# Patient Record
Sex: MALE | Race: WHITE | NOT HISPANIC OR LATINO | URBAN - METROPOLITAN AREA
[De-identification: names, ages, dates, MRNs, and addresses within clinical notes are randomized per-mention and may not be internally consistent; named-entity substitution may affect disease eponyms.]

---

## 2020-03-02 ENCOUNTER — OFFICE VISIT - HEALTHEAST (OUTPATIENT)
Dept: FAMILY MEDICINE | Facility: CLINIC | Age: 30
End: 2020-03-02

## 2020-03-02 ENCOUNTER — RECORDS - HEALTHEAST (OUTPATIENT)
Dept: ADMINISTRATIVE | Facility: OTHER | Age: 30
End: 2020-03-02

## 2020-03-02 DIAGNOSIS — S39.012A LOW BACK STRAIN, INITIAL ENCOUNTER: ICD-10-CM

## 2020-03-02 RX ORDER — CYCLOBENZAPRINE HCL 10 MG
10 TABLET ORAL EVERY 8 HOURS PRN
Qty: 30 TABLET | Refills: 0 | Status: SHIPPED | OUTPATIENT
Start: 2020-03-02

## 2020-03-02 ASSESSMENT — MIFFLIN-ST. JEOR: SCORE: 2266.25

## 2020-03-04 ENCOUNTER — OFFICE VISIT - HEALTHEAST (OUTPATIENT)
Dept: FAMILY MEDICINE | Facility: CLINIC | Age: 30
End: 2020-03-04

## 2020-03-04 DIAGNOSIS — S39.012D BACK STRAIN, SUBSEQUENT ENCOUNTER: ICD-10-CM

## 2020-03-11 ENCOUNTER — RECORDS - HEALTHEAST (OUTPATIENT)
Dept: GENERAL RADIOLOGY | Facility: CLINIC | Age: 30
End: 2020-03-11

## 2020-03-11 ENCOUNTER — OFFICE VISIT - HEALTHEAST (OUTPATIENT)
Dept: FAMILY MEDICINE | Facility: CLINIC | Age: 30
End: 2020-03-11

## 2020-03-11 DIAGNOSIS — S39.012D BACK STRAIN, SUBSEQUENT ENCOUNTER: ICD-10-CM

## 2020-03-11 DIAGNOSIS — S39.012D STRAIN OF MUSCLE, FASCIA AND TENDON OF LOWER BACK, SUBSEQUENT ENCOUNTER: ICD-10-CM

## 2020-03-12 ENCOUNTER — COMMUNICATION - HEALTHEAST (OUTPATIENT)
Dept: FAMILY MEDICINE | Facility: CLINIC | Age: 30
End: 2020-03-12

## 2020-03-19 ENCOUNTER — OFFICE VISIT - HEALTHEAST (OUTPATIENT)
Dept: PHYSICAL THERAPY | Facility: REHABILITATION | Age: 30
End: 2020-03-19

## 2020-03-19 DIAGNOSIS — M62.81 GENERALIZED MUSCLE WEAKNESS: ICD-10-CM

## 2020-03-19 DIAGNOSIS — M54.50 ACUTE RIGHT-SIDED LOW BACK PAIN WITHOUT SCIATICA: ICD-10-CM

## 2020-03-27 ENCOUNTER — OFFICE VISIT - HEALTHEAST (OUTPATIENT)
Dept: FAMILY MEDICINE | Facility: CLINIC | Age: 30
End: 2020-03-27

## 2020-03-27 DIAGNOSIS — S39.012D BACK STRAIN, SUBSEQUENT ENCOUNTER: ICD-10-CM

## 2020-07-22 ENCOUNTER — VIRTUAL VISIT (OUTPATIENT)
Dept: FAMILY MEDICINE | Facility: OTHER | Age: 30
End: 2020-07-22

## 2020-07-23 ENCOUNTER — AMBULATORY - HEALTHEAST (OUTPATIENT)
Dept: INTERNAL MEDICINE | Facility: CLINIC | Age: 30
End: 2020-07-23

## 2020-07-23 DIAGNOSIS — Z20.822 SUSPECTED 2019 NOVEL CORONAVIRUS INFECTION: ICD-10-CM

## 2020-07-23 NOTE — PROGRESS NOTES
"Date: 2020 09:13:34  Clinician: Sushant Kincaid  Clinician NPI: [id number]  Patient: Nick Naranjo  Patient : 1990  Patient Address: [address] [place] [address]  Patient Phone: [phone]  Visit Protocol: URI  Patient Summary:  Nick is a 29 year old ( : 1990 ) male who initiated a Visit for COVID-19 (Coronavirus) evaluation and screening. When asked the question \"Please sign me up to receive news, health information and promotions. \", Nick responded \"Yes\".    Nick states his symptoms started 1-2 days ago.   His symptoms consist of nausea, a cough, nasal congestion, rhinitis, malaise, and a headache. He is experiencing difficulty breathing due to nasal congestion but he is not short of breath.   Symptom details     Nasal secretions: The color of his mucus is white.    Cough: Nick coughs a few times an hour and his cough is not more bothersome at night. Phlegm comes into his throat when he coughs. He believes his cough is caused by post-nasal drip. The color of the phlegm is clear and yellow.     Headache: He states the headache is moderate (4-6 on a 10 point pain scale).      Nick denies having wheezing, teeth pain, ageusia, diarrhea, myalgias, sore throat, anosmia, facial pain or pressure, fever, vomiting, ear pain, and chills. He also denies having recent facial or sinus surgery in the past 60 days and taking antibiotic medication in the past month.   Precipitating events  He has not recently been exposed to someone with influenza. Nick has not been in close contact with any high risk individuals.   Pertinent COVID-19 (Coronavirus) information  In the past 14 days, Nick has not worked in a congregate living setting.   He does not work or volunteer as healthcare worker or a  and does not work or volunteer in a healthcare facility.   Nick also has not lived in a congregate living setting in the past 14 days. He lives with a healthcare worker.   " Nick has had a close contact with a laboratory-confirmed COVID-19 patient within 14 days of symptom onset. Additional information about contact with COVID-19 (Coronavirus) patient as reported by the patient (free text): Exposed at work with a co worker who tested positive.   Pertinent medical history  Nick does not need a return to work/school note.   Weight: 290 lbs   Nick smokes or uses smokeless tobacco.   Weight: 290 lbs    MEDICATIONS: No current medications, ALLERGIES: Penicillins, iodine  Clinician Response:  Dear Nick,   Your symptoms show that you may have coronavirus (COVID-19). This illness can cause fever, cough and trouble breathing. Many people get a mild case and get better on their own. Some people can get very sick.  What should I do?  We would like to test you for this virus.   1. Please call [phone] to schedule your visit. Explain that you were referred by [facility] to have a COVID-19 test. Be ready to share your [facility] visit ID number.  The following will serve as your written order for this COVID Test, ordered by me, for the indication of suspected COVID [Z20.828]: The test will be ordered in [facility], our electronic health record, after you are scheduled. It will show as ordered and authorized by Coleman Stack MD.  Order: COVID-19 (Coronavirus) PCR for SYMPTOMATIC testing from [facility].      2. When it's time for your COVID test:  Stay at least 6 feet away from others. (If someone will drive you to your test, stay in the backseat, as far away from the  as you can.)   Cover your mouth and nose with a mask, tissue or washcloth.  Go straight to the testing site. Don't make any stops on the way there or back.      3.Starting now: Stay home and away from others (self-isolate) until:   You've had no fever---and no medicine that reduces fever---for 3 full days (72 hours). And...   Your other symptoms have gotten better. For example, your cough or breathing has improved. And...   At least 10  "days have passed since your symptoms started.       During this time, don't leave the house except for testing or medical care.   Stay in your own room, even for meals. Use your own bathroom if you can.   Stay away from others in your home. No hugging, kissing or shaking hands. No visitors.  Don't go to work, school or anywhere else.    Clean \"high touch\" surfaces often (doorknobs, counters, handles, etc.). Use a household cleaning spray or wipes. You'll find a full list of  on the EPA website: www.epa.gov/pesticide-registration/list-n-disinfectants-use-against-sars-cov-2.   Cover your mouth and nose with a mask, tissue or washcloth to avoid spreading germs.  Wash your hands and face often. Use soap and water.  Caregivers in these groups are at risk for severe illness due to COVID-19:  o People 65 years and older  o People who live in a nursing home or long-term care facility  o People with chronic disease (lung, heart, cancer, diabetes, kidney, liver, immunologic)  o People who have a weakened immune system, including those who:   Are in cancer treatment  Take medicine that weakens the immune system, such as corticosteroids  Had a bone marrow or organ transplant  Have an immune deficiency  Have poorly controlled HIV or AIDS  Are obese (body mass index of 40 or higher)  Smoke regularly   o Caregivers should wear gloves while washing dishes, handling laundry and cleaning bedrooms and bathrooms.  o Use caution when washing and drying laundry: Don't shake dirty laundry, and use the warmest water setting that you can.  o For more tips, go to www.cdc.gov/coronavirus/2019-ncov/downloads/10Things.pdf.    4.Sign up for [employer]. We know it's scary to hear that you might have COVID-19. We want to track your symptoms to make sure you're okay over the next 2 weeks. Please look for an email from [employer]---this is a free, online program that we'll use to keep in touch. To sign up, follow the link in the email. " Learn more at [email or web address]  How can I take care of myself?   Get lots of rest. Drink extra fluids (unless a doctor has told you not to).   Take Tylenol (acetaminophen) for fever or pain. If you have liver or kidney problems, ask your family doctor if it's okay to take Tylenol.   Adults can take either:    650 mg (two 325 mg pills) every 4 to 6 hours, or...   1,000 mg (two 500 mg pills) every 8 hours as needed.    Note: Don't take more than 3,000 mg in one day. Acetaminophen is found in many medicines (both prescribed and over-the-counter medicines). Read all labels to be sure you don't take too much.   For children, check the Tylenol bottle for the right dose. The dose is based on the child's age or weight.    If you have other health problems (like cancer, heart failure, an organ transplant or severe kidney disease): Call your specialty clinic if you don't feel better in the next 2 days.       Know when to call 911. Emergency warning signs include:    Trouble breathing or shortness of breath Pain or pressure in the chest that doesn't go away Feeling confused like you haven't felt before, or not being able to wake up Bluish-colored lips or face.  Where can I get more information?   [facility] -- About COVID-19: [email or web address]   CDC -- What to Do If You're Sick: www.cdc.gov/coronavirus/2019-ncov/about/steps-when-sick.html   CDC -- Ending Home Isolation: www.cdc.gov/coronavirus/2019-ncov/hcp/disposition-in-home-patients.html   CDC -- Caring for Someone: www.cdc.gov/coronavirus/2019-ncov/if-you-are-sick/care-for-someone.html   [facility] -- Interim Guidance for Hospital Discharge to Home: www.health.[facility].[place]/diseases/coronavirus/hcp/hospdischarge.pdf   [facility] clinical trials (COVID-19 research studies): clinicalaffairs.[facility]-clinical-trials    Below are the COVID-19 hotlines at the [place] Department of Health ([facility]). Interpreters are available.    For  health questions: Call 189-315-2566 or 1-712.543.9502 (7 a.m. to 7 p.m.) For questions about schools and childcare: Call 903-707-1071 or 1-190.694.3081 (7 a.m. to 7 p.m.)    Diagnosis: Nasal congestion  Diagnosis ICD: R09.81

## 2020-07-24 ENCOUNTER — AMBULATORY - HEALTHEAST (OUTPATIENT)
Dept: FAMILY MEDICINE | Facility: CLINIC | Age: 30
End: 2020-07-24

## 2020-07-24 DIAGNOSIS — Z20.822 SUSPECTED 2019 NOVEL CORONAVIRUS INFECTION: ICD-10-CM

## 2020-07-28 ENCOUNTER — COMMUNICATION - HEALTHEAST (OUTPATIENT)
Dept: SCHEDULING | Facility: CLINIC | Age: 30
End: 2020-07-28

## 2021-05-26 ENCOUNTER — RECORDS - HEALTHEAST (OUTPATIENT)
Dept: ADMINISTRATIVE | Facility: CLINIC | Age: 31
End: 2021-05-26

## 2021-05-27 ENCOUNTER — RECORDS - HEALTHEAST (OUTPATIENT)
Dept: ADMINISTRATIVE | Facility: CLINIC | Age: 31
End: 2021-05-27

## 2021-05-28 ENCOUNTER — RECORDS - HEALTHEAST (OUTPATIENT)
Dept: ADMINISTRATIVE | Facility: CLINIC | Age: 31
End: 2021-05-28

## 2021-06-04 VITALS
OXYGEN SATURATION: 97 % | BODY MASS INDEX: 44.85 KG/M2 | WEIGHT: 295 LBS | HEART RATE: 92 BPM | SYSTOLIC BLOOD PRESSURE: 128 MMHG | DIASTOLIC BLOOD PRESSURE: 77 MMHG

## 2021-06-04 VITALS
TEMPERATURE: 98.3 F | DIASTOLIC BLOOD PRESSURE: 80 MMHG | RESPIRATION RATE: 20 BRPM | SYSTOLIC BLOOD PRESSURE: 116 MMHG | HEART RATE: 95 BPM | WEIGHT: 294.7 LBS | BODY MASS INDEX: 44.66 KG/M2 | HEIGHT: 68 IN | OXYGEN SATURATION: 97 %

## 2021-06-04 VITALS
SYSTOLIC BLOOD PRESSURE: 135 MMHG | DIASTOLIC BLOOD PRESSURE: 79 MMHG | WEIGHT: 295 LBS | BODY MASS INDEX: 44.85 KG/M2 | OXYGEN SATURATION: 96 % | HEART RATE: 102 BPM

## 2021-06-06 NOTE — PROGRESS NOTES
Office Visit - Follow Up   Nick Naranjo   29 y.o. male    Date of Visit: 3/11/2020    Chief Complaint   Patient presents with     Back Pain        Assessment and Plan   1. Back strain, subsequent encounter  Discussed diagnosis and treatment option with patient and recommend work restriction and physical therapy.   - XR Lumbar Spine 2 or 3 VWS; Future  EXAM: XR LUMBAR SPINE 2 OR 3 VWS  LOCATION: [facility]  DATE/TIME: 3/11/2020 12:19 PM     INDICATION: Strain of muscle, fascia and tendon of lower back, subsequent encounter  COMPARISON: None.     IMPRESSION:   5 lumbar type vertebral bodies. Mild right lumbar curvature. Normal vertebral body heights and lateral lumbar alignment. Lumbar disc heights preserved. Mild facet degeneration lower lumbar facets. Sacrum and partially visualized bony pelvis   grossly unremarkable.  - Ambulatory referral to Adult PT- Internal    The following high BMI interventions were performed this visit: encouragement to exercise and dietary management education, guidance, and counseling    No follow-ups on file.     History of Present Illness   This 29 y.o. old male patient who returns to the clinic today for follow-up after he was seen at the walk-in clinic and also was seen in my office for a back strain.  Patient has been on work restriction for the past 7 days.  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  He did report that his pain still exist and his pain has not gotten better.  He stated that he takes ibuprofen and sometimes take the Flexeril and some time he uses lidocaine cream on his back which does help.  He stated that when he does not use this medications that his back still hurts.  Patient denied any numbness or tingling of his lower extremities and his upper extremities.  Patient is requesting not to go back to work.    Review of Systems: A comprehensive review of systems was negative  except as noted.     Medications, Allergies and Problem List   Reviewed and updated     Physical Exam   General Appearance:   Well groomed    /77   Pulse 92   Wt (!) 295 lb (133.8 kg)   SpO2 97%   BMI 44.85 kg/m         Additional Information   Current Outpatient Medications   Medication Sig Dispense Refill     cyclobenzaprine (FLEXERIL) 10 MG tablet Take 1 tablet (10 mg total) by mouth every 8 (eight) hours as needed for muscle spasms. 30 tablet 0     No current facility-administered medications for this visit.      Allergies   Allergen Reactions     Iodine Other (See Comments)     Penicillins      Social History     Tobacco Use     Smoking status: Current Every Day Smoker     Types: Cigarettes     Smokeless tobacco: Never Used     Tobacco comment: No Vaping   Substance Use Topics     Alcohol use: Not on file     Drug use: Not on file        Time: total time spent with the patient was 15 minutes of which >50% was spent in counseling and coordination of care     Julissa Brown, CNP

## 2021-06-06 NOTE — PROGRESS NOTES
Walk In Care Note                                                        Date of Visit: 3/2/2020     Chief Complaint   Nick Naranjo is a(n) 29 y.o. White or  male who presents to Walk In Care with the following complaint(s):  injured lower back this morning lifting heavy boxes       Assessment and Plan   1. Low back strain, initial encounter  - cyclobenzaprine (FLEXERIL) 10 MG tablet; Take 1 tablet (10 mg total) by mouth every 8 (eight) hours as needed for muscle spasms.  Dispense: 30 tablet; Refill: 0      Patient presenting with acute low back strain following a lifting injury at work.  Mechanism of injury is not concerning for compression fracture, and patient does not have bony tenderness to suggest this.  X-rays of the lumbar spine therefore not completed at this time.  Recommended use of alternating doses of acetaminophen and ibuprofen as needed for pain as well as application of heat to the low back for 20 minutes at least 4 times daily.  Have prescribed cyclobenzaprine to be used as needed for muscle spasm.  Cautioned patient regarding the sedating nature of this medication.  Work excuse provided for 3/2/2020 through 3/4/2020.    Counseled the patient regarding assessment and plan for evaluation and treatment. Questions were answered. See AVS for the specific written instructions and educational handout(s) regarding back strain that were provided at the conclusion of the visit.     Discussed signs / symptoms that warrant urgent / emergent medical attention.     Follow up has been scheduled with Primary Care in 2 days for recheck and clearance to return to work.     History of Present Illness   Work related injury: Yes  Date of injury: 3/2/2020  Time of injury: Approximately 10:30 AM  Employer: [employer]  Job title: [employer]  Mechanism of injury: Lifting heavy boxes (50-60 lbs)  Primary symptom: Back pain  Progression: Persisting  Laterality/Location: Bilateral lower back  Quality:  "Aching at rest, sharp with movement / activity  Pain rating (0-10): 7  Frequency: Constant but waxes and wanes  Exacerbating factors: Flexion / extension, position changes, twisting / tilting  Relieving factors: Rest  Radicular pain: No  Lower extremity weakness: No  Lower extremity paresthesias: No  Saddle anesthesia: No  Bowel incontinence: No  Bladder incontinence: No  History of similar pain: No  History of spine surgery: No  Home treatments utilized: Ibuprofen 400 mg     Review of Systems   Review of Systems   All other systems reviewed and are negative.       Physical Exam   Vitals:    03/02/20 1147   BP: 116/80   Patient Site: Right Arm   Patient Position: Sitting   Cuff Size: Adult Large   Pulse: 95   Resp: 20   Temp: 98.3  F (36.8  C)   TempSrc: Oral   SpO2: 97%   Weight: (!) 294 lb 11.2 oz (133.7 kg)   Height: 5' 8\" (1.727 m)     Physical Exam  Vitals signs and nursing note reviewed.   Constitutional:       General: He is not in acute distress.     Appearance: He is well-developed. He is obese. He is not ill-appearing or toxic-appearing.   Cardiovascular:      Rate and Rhythm: Normal rate and regular rhythm.      Heart sounds: S1 normal and S2 normal. No murmur. No friction rub. No gallop.    Pulmonary:      Effort: Pulmonary effort is normal.      Breath sounds: Normal breath sounds. No stridor. No wheezing, rhonchi or rales.   Musculoskeletal:      Cervical back: He exhibits no tenderness, no bony tenderness and no spasm.      Thoracic back: He exhibits no tenderness, no bony tenderness and no spasm.      Lumbar back: He exhibits tenderness (bilateral paraspinal muscles) and spasm (bilateral paraspinal muscles). He exhibits no bony tenderness.      Comments: Strength with ankle plantarflexion/dorsiflexion, knee flexion/extension, and hip flexion/extension 5 out of 5 and symmetric.   Skin:     General: Skin is warm and dry.      Coloration: Skin is not pale.      Findings: No rash.   Neurological:      " General: No focal deficit present.      Mental Status: He is alert and oriented to person, place, and time.      Sensory: Sensation is intact.      Motor: Motor function is intact.      Deep Tendon Reflexes:      Reflex Scores:       Patellar reflexes are 2+ on the right side and 2+ on the left side.     Comments: No clonus at the ankle bilaterally.           Diagnostic Studies   Laboratory:  N/A  Radiology:  N/A  Electrocardiogram:  N/A     Procedure Note   N/A     Pertinent History   The following portions of the patient's history were reviewed and updated as appropriate: allergies, current medications, past family history, past medical history, past social history, past surgical history and problem list.    Patient does not have a problem list on file.    Patient has no past medical history on file.    Patient has no past surgical history on file.    Patient's family history is not on file.    Patient reports that he has been smoking cigarettes. He has never used smokeless tobacco.     Portions of this note have been dictated using voice recognition software. Any grammatical or contextual distortions are unintentional and inherent to the software.    Mick Mendez MD  [facility]

## 2021-06-06 NOTE — TELEPHONE ENCOUNTER
----- Message from JULIANNE Mitchell sent at 3/11/2020  2:41 PM CDT -----  X-ray was unremarkable.  Recommend that patient continue with physical therapy and follow-up as discussed.

## 2021-06-06 NOTE — PROGRESS NOTES
Assessment/Plan:   1. Back strain, subsequent encounter  Natural history and expected course discussed. Questions answered.  Proper lifting, bending technique discussed.  Stretching exercises discussed.  Short (2-4 day) period of relative rest recommended until acute symptoms improve.  Work note provided  Heat to affected area as needed for local pain relief.  NSAIDs per medication orders.  Muscle relaxants per medication orders.  Follow-up in 1 week.     Subjective:   Nick Naranjo is a 29 y.o. male who presents for follow up of low back pain.  Patient reported that he sustained back injury following lifting a heavy object at work on Monday, March 2.  Patient was seen at the walk-in clinic after the injury where he was evaluated and informed that he did sustain a back strain and was prescribed Flexeril for his back.  Patient reported that he felt better on Tuesday after using the Flexeril but today he did not take the Flexeril because he was driving and he is feeling the pain again.  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  Patient denied any numbness or tingling of his lower extremity or radiation of the pain.  Patient has no other concerns today.      The following portions of the patient's history were reviewed and updated as appropriate: allergies, current medications, past family history, past medical history, past social history, past surgical history and problem list.    Review of Systems  A 12 point comprehensive review of systems was negative except as noted.      Objective:    Full range of motion without pain, no tenderness, no spasm, no curvature.  Normal reflexes, gait, strength and negative straight-leg raise.  Inspection and palpation: paraspinal tenderness noted right lower back.  Muscle tone and ROM exam: limited range of motion with pain.  Neurological: normal DTRs, muscle strength and  reflexes.

## 2021-06-07 NOTE — PROGRESS NOTES
"Nick Naranjo is a 29 y.o. male who is being evaluated via a billable telephone visit.      The patient has been notified of following:     \"This telephone visit will be conducted via a call between you and your physician/provider. We have found that certain health care needs can be provided without the need for a physical exam.  This service lets us provide the care you need with a short phone conversation.  If a prescription is necessary we can send it directly to your pharmacy.  If lab work is needed we can place an order for that and you can then stop by our lab to have the test done at a later time.    If during the course of the call the physician/provider feels a telephone visit is not appropriate, you will not be charged for this service.\"         Nick Naranjo complains of    Chief Complaint   Patient presents with     Follow-up     would like a return to work note       I have reviewed and updated the patient's Past Medical History, Social History, Family History and Medication List.    ALLERGIES  Iodine and Penicillins    Patient presents via telephone to discuss his low back pain.  Patient sustained an injury on 3/2 while at work.  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  Patient has been out of work since then.  Originally complained of right low back pain.  He was able to attend 1 session of physical therapy, but subsequent visits were canceled due to the coronavirus.  He is not currently taking any medications for his back pain.  He has been doing some home stretches.  Patient is feeling a lot better and feels like he is ready to return back to work without restriction.  He denies any radicular symptoms.    Assessment/Plan:  1. Back strain, subsequent encounter  Patient improved per report.  Letter written to return back to work next week without restriction.  Discussed some home care treatments to use as needed.  Recommend following back up in clinic if his symptoms worsen again.        Phone call " duration:  5 minutes  Start: 902  End: 907    Melissa Cleveland NP

## 2021-06-07 NOTE — PROGRESS NOTES
[facility] Discharge Summary  Patient Name: Nick Naranjo  Date: 4/2/2020  Referral Diagnosis: back pain  Referring provider: Julissa Brown FNP  Visit Diagnosis:   1. Acute right-sided low back pain without sciatica     2. Generalized muscle weakness         Goals:  Pt. will be independent with home exercise program in : 6 weeks    Pt will: Able to return to work to perform regular tasks within 8-12 visits  Pt will: Able to get in and out of a car with more ease and pain 0-2/10 within 8-12 visits  Pt will: Able to bend, such as to put on shoes and socks, with pain 0-2/10 wtihin 8-12 visits  Pt will: Able to lift heavier objects, > 30#, at home or at work within 8-12 visits      Patient was seen for 1 visits from 3/19/20.  Other visits were canceled due to Covid-19.  Patient had a virtual visit with MD and is returning to work.  He no longer needs physical therpy.     Therapy will be discontinued at this time.  The patient will need a new referral to resume.    Thank you for your referral.  Emelyn FAROOQ Tracey, PT  4/2/2020  9:43 AM    [facility]  [facility] Initial Evaluation    Patient Name: Nick Naranjo  Date of evaluation: 3/19/2020  Referral Diagnosis: Back strain, subsequent encounter  Referring provider: Julissa Brown FNP  Visit Diagnosis:     ICD-10-CM    1. Acute right-sided low back pain without sciatica  M54.5    2. Generalized muscle weakness  M62.81        Assessment:      Patient was lifting a heavy box at work on 3/2/2020 when he hurt the right side of his low back.  He presents with mechanical low back pain with SLR and Slump Tests being negative.  Able to reproduce pain with palpation, decreased AROM and decreased MONICA.  Functional limitations are described as occurring with: prolonged sitting, getting in and out of car, bending, not working, lifting heavier objects.  Pt. is appropriate and a good candidate for skilled PT intervention as outlined  in the Plan of Care (POC) to improve pain levels and function.  Feel he will benefit from a review on proper lifting mechanics and other  education to prevent re-injury in the future.  He felt the e-stim was helpful and felt the stretches targeted the injured area.2    Goals:  Pt. will be independent with home exercise program in : 6 weeks    Pt will: Able to return to work to perform regular tasks within 8-12 visits  Pt will: Able to get in and out of a car with more ease and pain 0-2/10 within 8-12 visits  Pt will: Able to bend, such as to put on shoes and socks, with pain 0-2/10 wtihin 8-12 visits  Pt will: Able to lift heavier objects, > 30#, at home or at work within 8-12 visits      Patient's expectations/goals are realistic.  Overweight    POC, goals and pathology of condition were reviewed with the patient.  Patient is in agreement with the POC.       Plan / Patient Instructions:        Plan of Care:   Authorization / Certification Start Date: 03/19/20  Authorization / Certification End Date: 06/19/20  Authorization / Certification Number of Visits: 8-12  Communication with: Referral Source  Patient Related Instruction: Nature of Condition;Treatment plan and rationale;Self Care instruction;Basis of treatment;Body mechanics;Posture  Times per Week: 1-2  Number of Visits: 8-12  Therapeutic Exercise: ROM;Stretching;Strengthening  Neuromuscular Reeducation: kinesio tape;posture;core  Manual Therapy: soft tissue mobilization;myofascial release;joint mobilization  Modalities: electrical stimulation;TENS      Plan for next visit:   Review stretches, add others as needed  E-stim if helpful  KT if helpful  edu for posture and body mechanics  Strengthening as able       Subjective:         History of Present Illness:    Nick is a 29 y.o. male who presents to therapy today with complaints of right low back pain after lifting a box at work on 3/2/2020. He does not have any leg symptoms.  He does a lot of  lifting at work.  He went to the walk-in clinic and has followed-up with his MD.  He had an X-ray which was negative.  Denies having a history of low back pain.    Symptoms are constant and getting better.     Pain Ratin  Pain rating at best: 4  Pain rating at worst: 7  Pain description: aching and soreness    Functional limitations are described as occurring with:   prolonged sitting, getting in and out of car, bending, not working, lifting heavier objects    Patient reports benefit from:  stretching, heat       Objective:      Note: Items left blank indicates the item was not performed or not indicated at the time of the evaluation.    Patient Outcome Measures :    Modified Oswestry Low Back Pain Disablity Questionnaire  in %: 32     Scores range from 0-100%, where a score of 0% represents minimal pain and maximal function. The minimal clinically important difference is a score reduction of 12%.    Examination  1. Acute right-sided low back pain without sciatica     2. Generalized muscle weakness       Involved side: Right  Posture Observation:      General standing posture is fair.    Lumbar ROM:         Within Normal Limits unless noted  Date: 20     *Indicate scale AROM AROM AROM   Lumbar Flexion Hands to knees, Pain on right     Lumbar Extension 50% limited, pain on right      Right Left Right Left Right Left   Lumbar Sidebending         Lumbar Rotation Pain on right        Thoracic Flexion      Thoracic Extension      Thoracic Sidebending         Thoracic Rotation           Lower Extremity Strength:     Date: 20     LE strength/5 Right Left Right Left Right Left   Hip Flexion (L1-3) 4+  P 5       Hip Extension (L5-S1) 4+  P 5-       Hip Abduction (L4-5) 5 5       Hip Adduction (L2-3) 5- 5-       Hip External Rotation         Hip Internal Rotation         Knee Extension (L3-4) 5 5       Knee Flexion 5 5       Ankle Dorsiflexion (L4-5)         Great Toe Extension (L5)         Ankle Plantar  "flexion (S1)         Abdominals        Sensation           Reflex Testing  Lumbar Dermatomes Right Left UE Reflexes Right Left   Iliac Crest and Groin (L1)   Biceps (C5-6)     Anterior Medial Thigh (L2)   Brachioradialis (C5-6)     Anterior Thigh, Medial Epicondyle Femur (L3)   Triceps (C7-8)     Lateral Thigh, Anterior Knee, Medial Leg/Malleolus (L4)   Ruba s test - -   Lateral Leg, Dorsal Foot (L5)   LE Reflexes     Lateral Foot (S1)   Patellar (L3-4)     Posterior Leg (S2)   Achilles (S1-2)     Other:   Babinski Response - -     Palpation: pain over right lower thoracic and lumbar paraspinals, right QL, right latissimus dorsi  1 Leg Stance: R=3 seconds, Pain, L=20 seconds  Trendelenberg: reverse Trendelenberg on right  Squat: hips deviate to right  Able to toe and heel walk  Bridging: weakness demonstrated with quality of task, pain on right    Lumbar Special Tests:     Lumbar Special Tests Right Left SI Tests Right  Left   Quadrant test   SI Compression     Straight leg raise - - SI Distraction     Crossover response   POSH Test     Slump - - Sacral Thrust     Sit-up test  FADIR     Trunk extensor endurance test  Resisted Abduction     Prone instability test  Other:     Pubic shotgun  Other:       LE Screen:  fair hamstring flexibility, normal hip AROM     Exercises:  Exercise #1: stretches:  sitting hamstring, SKC, LTR,   hold 30 seconds X 1-3 reps  Comment #1: catback   hold 5-10 seconds x 5-10 reps    Treatment Today     TREATMENT MINUTES COMMENTS   Evaluation 20 lumbar   Self-care/ Home management     Manual therapy     Neuromuscular Re-education 8+2 set up and explanation, precautions KT, 2\", 5 blocks, paper off tension, distal to proximal with slight trunk flexion, bilateral lumbar and lower thoracic paraspinals   Therapeutic Activity     Therapeutic Exercises 15 See above or flow sheet   Gait training     Modality__________________           electrical stimulation 10+3 set up and explanation, " precautions Pt prone with pillow under chest and feet  4 pads, right low thoracic and lumbar paraspinals, IFC, sweep   Total 58    Blank areas are intentional and mean the treatment did not include these items.       PT Evaluation Code: (Please list factors)  Patient History/Comorbidities: overweight  Examination: pain to palpation, decrease strength  Clinical Presentation: stable  Clinical Decision Making: low    Patient History/  Comorbidities Examination  (body structures and functions, activity limitations, and/or participation restrictions) Clinical Presentation Clinical Decision Making (Complexity)   No documented Comorbidities or personal factors 1-2 Elements Stable and/or uncomplicated Low   1-2 documented comorbidities or personal factor 3 Elements Evolving clinical presentation with changing characteristics Moderate   3-4 documented comorbidities or personal factors 4 or more Unstable and unpredictable High              Emelyn Tracey, PT  3/19/2020  10:03 AM

## 2021-06-18 NOTE — PATIENT INSTRUCTIONS - HE
Patient Instructions by Mick Mendez MD at 3/2/2020 11:40 AM     Author: Mick Mendez MD Service: -- Author Type: Physician    Filed: 3/2/2020 12:17 PM Encounter Date: 3/2/2020 Status: Addendum    : Mick Mendez MD (Physician)    Related Notes: Original Note by Mick Mendez MD (Physician) filed at 3/2/2020 12:17 PM       -Your history and examination findings are consistent with a low back strain.  -Take alternating doses of acetaminophen 1000 mg every 6 hours and ibuprofen 600 mg every 6 hours as needed for pain.  -Apply heat to the low back for 20 minutes at least 4 times daily.  -Take cyclobenzaprine only as needed for muscle spasm. This medication can be sedating. Do not drive within 8 hours of taking it. Do not drink alcoholic beverages while using this medication. Do not take other sedating medications while using this medication.  -Work excuse provided for 3/2/2020-3/4/2020.  -Please follow up with Primary Care on 3/4/2020 for recheck of symptoms and return to work.  Patient Education     Back Sprain or Strain    Injury to the muscles (strain) or ligaments (sprain) around the spine can be troubling. Injury may occur after a sudden forceful twisting or bending force such as in a car accident, after a simple awkward movement, or after lifting something heavy with poor body positioning. In any case, muscle spasm is often present and adds to the pain.  Thankfully, most people feel better in 1 to 2 weeks, and most of the rest in 1 to 2 months. Most people can remain active. Unless you had a forceful or traumatic physical injury such as a car accident or fall, X-rays may not be ordered for the first evaluation of a back sprain or strain. If pain continues and does not respond to medical treatment, your healthcare provider may then order X-rays and other tests.  Home care  The following guidelines will help you care for your injury at home:    When in bed, try to find a comfortable  position. A firm mattress is best. Try lying flat on your back with pillows under your knees. You can also try lying on your side with your knees bent up toward your chest and a pillow between your knees.    Don't sit for long periods. Try not to take long car rides or take other trips that have you sitting for a long time. This puts more stress on the lower back than standing or walking.    During the first 24 to 72 hours after an injury or flare-up, apply an ice pack to the painful area for 20 minutes. Then remove it for 20 minutes. Do this for 60 to 90 minutes, or several times a day. This will reduce swelling and pain. Be sure to wrap the ice pack in a thin towel or plastic to protect your skin.    You can start with ice, then switch to heat. Heat from a hot shower, hot bath, or heating pad reduces pain and works well for muscle spasms. Put heat on the painful area for 20 minutes, then remove for 20 minutes. Do this for 60 to 90 minutes, or several times a day. Do not use a heating pad while sleeping. It can burn the skin.    You can alternate the ice and heat. Talk with your healthcare provider to find out the best treatment or therapy for your back pain.    Therapeutic massage will help relax the back muscles without stretching them.    Be aware of safe lifting methods. Do not lift anything over 15 pounds until all of the pain is gone.  Medicines  Talk to your healthcare provider before using medicines, especially if you have other health problems or are taking other medicines.    You may use acetaminophen or ibuprofen to control pain, unless another pain medicine was prescribed. If you have chronic conditions like diabetes, liver or kidney disease, stomach ulcers, or gastrointestinal bleeding, or are taking blood-thinner medicines, talk with your doctor before taking any medicines.    Be careful if you are given prescription medicines, narcotics, or medicine for muscle spasm. They can cause drowsiness, and  affect your coordination, reflexes, and judgment. Do not drive or operate heavy machinery when taking these types of medicines. Only take pain medicine as prescribed by your healthcare provider.  Follow-up care  Follow up with your healthcare provider, or as advised. You may need physical therapy or more tests if your symptoms get worse.  If you had X-rays your healthcare provider may be checking for any broken bones, breaks, or fractures. Bruises and sprains can sometimes hurt as much as a fracture. These injuries can take time to heal completely. If your symptoms dont improve or they get worse, talk with your healthcare provider. You may need a repeat X-ray or other tests.  Call 911  Call 911 if any of the following occur:    Trouble breathing    Confused    Very drowsy or trouble awakening    Fainting or loss of consciousness    Rapid or very slow heart rate    Loss of bowel or bladder control  When to seek medical advice  Call your healthcare provider right away if any of the following occur:    Pain gets worse or spreads to your arms or legs    Weakness or numbness in one or both arms or legs    Numbness in the groin or genital area  Date Last Reviewed: 6/1/2016 2000-2017 [employer]. [address] [place] [address]. All rights reserved. This information is not intended as a substitute for professional medical care. Always follow your healthcare professional's instructions.

## 2021-06-20 NOTE — LETTER
Letter by Mick Mendez MD at      Author: Mick Mendez MD Service: -- Author Type: --    Filed:  Encounter Date: 3/2/2020 Status: (Other)         March 2, 2020     Patient: Nick Naranjo   YOB: 1990   Date of Visit: 3/2/2020       To Whom it May Concern:    Nick Naranjo was seen in my clinic on 3/2/2020.  Please excuse his absence from work from 3/2/2020 through 3/4/2020 due to injury.  Return to work will be discussed at his follow up visit on 3/4/2020.    If you have any questions or concerns, please don't hesitate to call.    Sincerely,         Electronically signed by Mick Mendez MD

## 2021-06-20 NOTE — LETTER
Letter by Melissa Cleveland NP at      Author: Melissa Cleveland NP Service: -- Author Type: --    Filed:  Encounter Date: 3/27/2020 Status: (Other)         March 27, 2020     Patient: Nick Naranjo   YOB: 1990   Date of Visit: 3/27/2020       To Whom It May Concern:    It is my medical opinion that Nick Naranjo may return to full duty starting 3/30/2020 with no restrictions.    If you have any questions or concerns, please don't hesitate to call.    Sincerely,        Electronically signed by Melissa Cleveland NP

## 2021-06-20 NOTE — LETTER
Letter by Julissa Brown FNP at      Author: Julissa Brown FNP Service: -- Author Type: --    Filed:  Encounter Date: 3/4/2020 Status: (Other)         March 4, 2020     Patient: Nick Naranjo   YOB: 1990   Date of Visit: 3/4/2020       To Whom It May Concern:    Nick Naranjo was seen in my clinic on 3/4/2020. It is my medical opinion that Nick Naranjo may return to light duty immediately with the following restrictions: Not lifting, pulling and pushing anything greater than 10 pounds. .    If you have any questions or concerns, please don't hesitate to call.    Sincerely,        Electronically signed by JULIANNE Mitchell

## 2021-06-20 NOTE — LETTER
Letter by Julissa Brown FNP at      Author: Julissa Brown FNP Service: -- Author Type: --    Filed:  Encounter Date: 3/11/2020 Status: (Other)         March 11, 2020     Patient: Nick Naranjo   YOB: 1990   Date of Visit: 3/11/2020       To Whom It May Concern:    Nick Naranjo was seen in my clinic on 3/11/2020. It is my medical opinion that Nick Naranjo may return to light duty immediately with the following restrictions: Not lifting, pulling and pushing anything greater than 5 pounds. He will start physical therapy for his back and be evaluated every two weeks.     If you have any questions or concerns, please don't hesitate to call.    Sincerely,        Electronically signed by JULIANNE Mitchell

## 2025-03-01 ENCOUNTER — OFFICE VISIT (OUTPATIENT)
Dept: URGENT CARE | Facility: URGENT CARE | Age: 35
End: 2025-03-01
Payer: COMMERCIAL

## 2025-03-01 VITALS
HEART RATE: 53 BPM | DIASTOLIC BLOOD PRESSURE: 75 MMHG | RESPIRATION RATE: 16 BRPM | BODY MASS INDEX: 28.43 KG/M2 | TEMPERATURE: 97.5 F | WEIGHT: 187 LBS | OXYGEN SATURATION: 96 % | SYSTOLIC BLOOD PRESSURE: 116 MMHG

## 2025-03-01 DIAGNOSIS — R10.11 RUQ ABDOMINAL PAIN: Primary | ICD-10-CM

## 2025-03-01 PROCEDURE — 3078F DIAST BP <80 MM HG: CPT | Performed by: PHYSICIAN ASSISTANT

## 2025-03-01 PROCEDURE — 99203 OFFICE O/P NEW LOW 30 MIN: CPT | Performed by: PHYSICIAN ASSISTANT

## 2025-03-01 PROCEDURE — 3074F SYST BP LT 130 MM HG: CPT | Performed by: PHYSICIAN ASSISTANT

## 2025-03-01 RX ORDER — OMEPRAZOLE 20 MG/1
20 CAPSULE, DELAYED RELEASE ORAL DAILY
Qty: 30 CAPSULE | Refills: 2 | Status: SHIPPED | OUTPATIENT
Start: 2025-03-01

## 2025-03-01 NOTE — PROGRESS NOTES
Assessment & Plan     1. RUQ abdominal pain (Primary)    - omeprazole (PRILOSEC) 20 MG DR capsule; Take 1 capsule (20 mg) by mouth daily.  Dispense: 30 capsule; Refill: 2     Encouraged to get a clinic appointment despite cale job schedule.                   [facility] [place] is a 34 year old male who presents to clinic today for the following health issues:  Chief Complaint   Patient presents with    Urgent Care     Pt states every time after he eat he has pain on upper right quadrant. The symptoms have been going on for a couple months.       HPI    Here for pain to RUQ starting 9 months ago off and on. Starts when eating fatty foods. Lasts 4-6 hours and feels miserable. He has tried anti-acids. Tums only. No black or red stools. Slight nausea when symptoms flare. 8/10 intensity with last flare last night while eating pizza and ice cream.           Review of Systems        Objective    /75   Pulse 53   Temp 97.5  F (36.4  C) (Tympanic)   Resp 16   Wt 84.8 kg (187 lb)   SpO2 96%   BMI 28.43 kg/m    Physical Exam  Abdominal:      General: Abdomen is flat.      Palpations: Abdomen is soft.      Tenderness: There is abdominal tenderness. There is no right CVA tenderness or left CVA tenderness.          Comments: Mild tenderness

## 2025-03-17 ENCOUNTER — OFFICE VISIT (OUTPATIENT)
Dept: FAMILY MEDICINE | Facility: CLINIC | Age: 35
End: 2025-03-17
Payer: COMMERCIAL

## 2025-03-17 VITALS
HEIGHT: 70 IN | RESPIRATION RATE: 16 BRPM | SYSTOLIC BLOOD PRESSURE: 105 MMHG | BODY MASS INDEX: 27.23 KG/M2 | DIASTOLIC BLOOD PRESSURE: 66 MMHG | OXYGEN SATURATION: 98 % | TEMPERATURE: 97.4 F | WEIGHT: 190.2 LBS | HEART RATE: 56 BPM

## 2025-03-17 DIAGNOSIS — R10.11 ABDOMINAL PAIN, RIGHT UPPER QUADRANT: Primary | ICD-10-CM

## 2025-03-17 DIAGNOSIS — Z13.220 SCREENING FOR HYPERLIPIDEMIA: ICD-10-CM

## 2025-03-17 LAB
ERYTHROCYTE [DISTWIDTH] IN BLOOD BY AUTOMATED COUNT: 11.7 % (ref 10–15)
HCT VFR BLD AUTO: 41.5 % (ref 40–53)
HGB BLD-MCNC: 14.5 G/DL (ref 13.3–17.7)
MCH RBC QN AUTO: 31.6 PG (ref 26.5–33)
MCHC RBC AUTO-ENTMCNC: 34.9 G/DL (ref 31.5–36.5)
MCV RBC AUTO: 90 FL (ref 78–100)
PLATELET # BLD AUTO: 159 10E3/UL (ref 150–450)
RBC # BLD AUTO: 4.59 10E6/UL (ref 4.4–5.9)
WBC # BLD AUTO: 3.3 10E3/UL (ref 4–11)

## 2025-03-17 PROCEDURE — 80061 LIPID PANEL: CPT | Performed by: FAMILY MEDICINE

## 2025-03-17 PROCEDURE — 85027 COMPLETE CBC AUTOMATED: CPT | Performed by: FAMILY MEDICINE

## 2025-03-17 PROCEDURE — 99203 OFFICE O/P NEW LOW 30 MIN: CPT | Performed by: FAMILY MEDICINE

## 2025-03-17 PROCEDURE — 80053 COMPREHEN METABOLIC PANEL: CPT | Performed by: FAMILY MEDICINE

## 2025-03-17 PROCEDURE — 36415 COLL VENOUS BLD VENIPUNCTURE: CPT | Performed by: FAMILY MEDICINE

## 2025-03-17 PROCEDURE — 3074F SYST BP LT 130 MM HG: CPT | Performed by: FAMILY MEDICINE

## 2025-03-17 PROCEDURE — 3078F DIAST BP <80 MM HG: CPT | Performed by: FAMILY MEDICINE

## 2025-03-17 ASSESSMENT — ENCOUNTER SYMPTOMS: ABDOMINAL PAIN: 1

## 2025-03-17 NOTE — PROGRESS NOTES
"  {PROVIDER CHARTING PREFERENCE:395997}    Subjective   Nick is a 34 year old, presenting for the following health issues:  Abdominal Pain and Urgent Care        3/17/2025     8:23 AM   Additional Questions   Roomed by Irene   Accompanied by wife Vinay     History of Present Illness       Reason for visit:  Possible gal bladder obstruction  Symptom onset:  More than a month  Symptoms include:  Pain in upper right abdoman after eating large amounts of fatty foods  Symptom intensity:  Severe  Symptom progression:  Staying the same  Had these symptoms before:  Yes  Has tried/received treatment for these symptoms:  Yes  Previous treatment was successful:  No   He is taking medications regularly.          ED/UC Followup:    Facility:  [facility]  Date of visit: 3/1/2025  Reason for visit: RUQ abdominal pain  Current Status: still having issues, worse when eating fatty foods,   {additonal problems for provider to add (Optional):361309}    {ROS Picklists (Optional):869712}    No relief of abdominal pain with taking omeprazole.     RUQ pain happens 1-2 hours after eating, lasting up to 7 hours after eating fatty foods.     When he is eating leaner meats and vegetable, he does not have the discomfort.     He first started having the discomfort 6-7 months ago.     No problems with Bms.     No fever or chills.     He will sometimes induce vomiting to feel better. He has the symptoms about once every 2-4 weeks.         Objective    /66   Pulse 56   Temp 97.4  F (36.3  C) (Tympanic)   Resp 16   Ht 1.765 m (5' 9.5\")   Wt 86.3 kg (190 lb 3.2 oz)   SpO2 98%   BMI 27.68 kg/m    Body mass index is 27.68 kg/m .  Physical Exam   {Exam List (Optional):674150}    {Diagnostic Test Results (Optional):824860}        Signed Electronically by: Lev Mortensen DO  {Email feedback regarding this note to [email or web address]   :195726}  " "sometimes induce vomiting to feel better.  We discussed how this is risky, with the stomach acid being potentially harmful to his esophagus and upper airway and digestive tract.  He has the symptoms about once every 2-4 weeks.         Objective    /66   Pulse 56   Temp 97.4  F (36.3  C) (Tympanic)   Resp 16   Ht 1.765 m (5' 9.5\")   Wt 86.3 kg (190 lb 3.2 oz)   SpO2 98%   BMI 27.68 kg/m    Body mass index is 27.68 kg/m .  Physical Exam   Vital signs reviewed.  Patient is in no acute appearing distress.  Breathing appears nonlabored.  Patient is alert and oriented ×3.  Patient is pleasant, making good eye contact and responding with clear fluent speech.    Heart: Heart rate is regular without murmur.    Lungs: Lungs are clear to auscultation with good airflow bilaterally.    Back: No area of tenderness.    Abdomen:  Abdomen is soft, nontender.  No palpable abnormal masses or organomegaly.  Bowel sounds are normal.    Skin/extremities: Warm and dry, with no lower leg edema.            Signed Electronically by: Lev Mortensen DO    "

## 2025-03-18 LAB
ALBUMIN SERPL BCG-MCNC: 4.2 G/DL (ref 3.5–5.2)
ALP SERPL-CCNC: 90 U/L (ref 40–150)
ALT SERPL W P-5'-P-CCNC: 63 U/L (ref 0–70)
ANION GAP SERPL CALCULATED.3IONS-SCNC: 6 MMOL/L (ref 7–15)
AST SERPL W P-5'-P-CCNC: 57 U/L (ref 0–45)
BILIRUB SERPL-MCNC: 0.9 MG/DL
BUN SERPL-MCNC: 19.3 MG/DL (ref 6–20)
CALCIUM SERPL-MCNC: 9.8 MG/DL (ref 8.8–10.4)
CHLORIDE SERPL-SCNC: 108 MMOL/L (ref 98–107)
CHOLEST SERPL-MCNC: 143 MG/DL
CREAT SERPL-MCNC: 0.99 MG/DL (ref 0.67–1.17)
EGFRCR SERPLBLD CKD-EPI 2021: >90 ML/MIN/1.73M2
FASTING STATUS PATIENT QL REPORTED: NO
FASTING STATUS PATIENT QL REPORTED: NO
GLUCOSE SERPL-MCNC: 84 MG/DL (ref 70–99)
HCO3 SERPL-SCNC: 29 MMOL/L (ref 22–29)
HDLC SERPL-MCNC: 47 MG/DL
LDLC SERPL CALC-MCNC: 80 MG/DL
NONHDLC SERPL-MCNC: 96 MG/DL
POTASSIUM SERPL-SCNC: 4.7 MMOL/L (ref 3.4–5.3)
PROT SERPL-MCNC: 6.6 G/DL (ref 6.4–8.3)
SODIUM SERPL-SCNC: 143 MMOL/L (ref 135–145)
TRIGL SERPL-MCNC: 78 MG/DL

## 2025-03-31 ENCOUNTER — HOSPITAL ENCOUNTER (OUTPATIENT)
Dept: ULTRASOUND IMAGING | Facility: CLINIC | Age: 35
Discharge: HOME | End: 2025-03-31
Attending: FAMILY MEDICINE | Admitting: FAMILY MEDICINE
Payer: COMMERCIAL

## 2025-03-31 DIAGNOSIS — R10.11 ABDOMINAL PAIN, RIGHT UPPER QUADRANT: ICD-10-CM

## 2025-03-31 PROCEDURE — 76705 ECHO EXAM OF ABDOMEN: CPT

## 2025-04-05 ENCOUNTER — HEALTH MAINTENANCE LETTER (OUTPATIENT)
Age: 35
End: 2025-04-05

## 2025-04-07 ENCOUNTER — OFFICE VISIT (OUTPATIENT)
Dept: SURGERY | Facility: CLINIC | Age: 35
End: 2025-04-07
Payer: COMMERCIAL

## 2025-04-07 VITALS
OXYGEN SATURATION: 100 % | BODY MASS INDEX: 27.2 KG/M2 | SYSTOLIC BLOOD PRESSURE: 102 MMHG | DIASTOLIC BLOOD PRESSURE: 62 MMHG | RESPIRATION RATE: 16 BRPM | HEART RATE: 64 BPM | WEIGHT: 190 LBS | HEIGHT: 70 IN

## 2025-04-07 DIAGNOSIS — R10.11 ABDOMINAL PAIN, RIGHT UPPER QUADRANT: ICD-10-CM

## 2025-04-07 DIAGNOSIS — K80.20 CALCULUS OF GALLBLADDER WITHOUT CHOLECYSTITIS WITHOUT OBSTRUCTION: ICD-10-CM

## 2025-04-07 PROCEDURE — 3078F DIAST BP <80 MM HG: CPT | Performed by: SURGERY

## 2025-04-07 PROCEDURE — 3074F SYST BP LT 130 MM HG: CPT | Performed by: SURGERY

## 2025-04-07 PROCEDURE — 99204 OFFICE O/P NEW MOD 45 MIN: CPT | Performed by: SURGERY

## 2025-04-07 RX ORDER — INDOCYANINE GREEN AND WATER 25 MG
2.5 KIT INJECTION ONCE
OUTPATIENT
Start: 2025-04-07 | End: 2025-04-07

## 2025-04-07 NOTE — LETTER
April 7, 2025          Lev Mortensen DO  [address] [place] [address]      RE:   Nick Naranjo 1990      Dear Colleague,    Thank you for referring your patient, Nick Naranjo, to [facility]. Please see a copy of my visit note below.    Chief complaint:  Abdominal pain, right upper quadrant    HPI:  This patient is a 34 year old male who presents with intermittent RUQ pain after eating fat/greasy meals. He has had a 130 pound weight loss over the past two years. He states that his pain symptoms have been occurring for the past six months. He denies jaundice or icterus. The events usually last about 3-6 hours. He has not had prior abdominal surgery. Recent ultrasound showed gallstones.    Past Medical History:  Has no past medical history on file.    Past Surgical History:  No past surgical history on file.     Review of Systems:  The 10 point Review of Systems is negative other than noted in the HPI and above.    Physical Exam:  General - This is a well developed, well nourished male in no apparent distress.  HEENT - Normocephalic, atraumatic.  No scleral icterus, membranes moist.  Respiratory- non-labored  Gastrointestinal:   soft, non-distended without tenderness or Pritchett sign.  Extremities - warm without edema  Neurologic - non-focal  Psych-normal affect    Relevant labs:  Normal LFTs on 3/17/25    Imaging:  Films personally reviewed by me today.  Gallstones, mild wall thickening vs contraction.    Assessment and Plan:  I reviewed the pathophysiology of biliary tract disease, its natural history and indications for cholecystectomy.  I have recommended laparoscopic cholecystectomy, he is interested in pursuing this.  Risks including infection, bleeding, harm to structures, open conversion, bile leak, retained stone and common duct injury were discussed.  We will work on scheduling surgery at the patient s convenience.          Again, thank  you for allowing me to participate in the care of your patient.      Sincerely,      Lito Rivera MD

## 2025-04-07 NOTE — PROGRESS NOTES
Chief complaint:  Abdominal pain, right upper quadrant    HPI:  This patient is a 34 year old male who presents with intermittent RUQ pain after eating fat/greasy meals. He has had a 130 pound weight loss over the past two years. He states that his pain symptoms have been occurring for the past six months. He denies jaundice or icterus. The events usually last about 3-6 hours. He has not had prior abdominal surgery. Recent ultrasound showed gallstones.    Past Medical History:   has no past medical history on file.    Past Surgical History:  No past surgical history on file.     Social History:  Social History     Socioeconomic History    Marital status:      Spouse name: Not on file    Number of children: Not on file    Years of education: Not on file    Highest education level: Not on file   Occupational History    Not on file   Tobacco Use    Smoking status: Former     Types: Cigarettes    Smokeless tobacco: Never    Tobacco comments:     No Vaping   Vaping Use    Vaping status: Never Used   Substance and Sexual Activity    Alcohol use: Not on file    Drug use: Not on file    Sexual activity: Not on file   Other Topics Concern    Not on file   Social History Narrative    Not on file     Social Drivers of Health     Financial Resource Strain: High Risk (1/1/2022)    Received from [facility]    Financial Resource Strain     Difficulty of Paying Living Expenses: Not on file     Difficulty of Paying Living Expenses: Not on file   Food Insecurity: Not on file   Transportation Needs: Not on file   Physical Activity: Not on file   Stress: Not on file   Social Connections: Unknown (1/1/2022)    Received from [facility]    Social Connections     Frequency of Communication with Friends and Family: Not on file   Interpersonal Safety: Not on file   Housing Stability: Not on file        Family History:  Family History   Problem Relation Age of Onset     Hypothyroidism Mother     Myocardial Infarction Father 45       Review of Systems:  The 10 point Review of Systems is negative other than noted in the HPI and above.    Physical Exam:  General - This is a well developed, well nourished male in no apparent distress.  HEENT - Normocephalic, atraumatic.  No scleral icterus, membranes moist.  Respiratory- non-labored  Gastrointestinal:   soft, non-distended without tenderness or Pritchett sign.  Extremities - warm without edema  Neurologic - non-focal  Psych-normal affect      Relevant labs:  Normal LFTs on 3/17/25    Imaging:  Films personally reviewed by me today.  Gallstones, mild wall thickening vs contraction.    Assessment and Plan:  I reviewed the pathophysiology of biliary tract disease, its natural history and indications for cholecystectomy.  I have recommended laparoscopic cholecystectomy, he is interested in pursuing this.  Risks including infection, bleeding, harm to structures, open conversion, bile leak, retained stone and common duct injury were discussed.  We will work on scheduling surgery at the patient s convenience.    Lito Rivera MD  [facility]    Please route or send letter to:  Primary Care Provider (PCP)

## 2025-04-08 ENCOUNTER — TELEPHONE (OUTPATIENT)
Dept: SURGERY | Facility: CLINIC | Age: 35
End: 2025-04-08
Payer: COMMERCIAL

## 2025-04-08 NOTE — TELEPHONE ENCOUNTER
Type of surgery: ROBOTIC ASSISTED LAPAROSCOPIC CHOLECYSTECTOMY   Location of surgery: [facility]  Date and time of surgery: 5/30/2025 @ 10:15 AM   Surgeon: OZ ZUNIGA MD    Pre-Op Appt Date: PATIENT TO SCHEDULE    Post-Op Appt Date: PATIENT TO SCHEDULE     Packet sent out: Yes  Pre-cert/Authorization completed:  Not Applicable  Date: 4/8/2025         ROBOTIC ASSISTED LAPAROSCOPIC CHOLECYSTECTOMY GENERAL PT INST TO HAVE H&P WITH PCP 60 MIN REQ PA ASSIST RMO NMS

## 2025-05-12 ENCOUNTER — OFFICE VISIT (OUTPATIENT)
Dept: FAMILY MEDICINE | Facility: CLINIC | Age: 35
End: 2025-05-12
Payer: COMMERCIAL

## 2025-05-12 VITALS
DIASTOLIC BLOOD PRESSURE: 62 MMHG | RESPIRATION RATE: 16 BRPM | HEART RATE: 54 BPM | SYSTOLIC BLOOD PRESSURE: 96 MMHG | HEIGHT: 67 IN | BODY MASS INDEX: 29.65 KG/M2 | OXYGEN SATURATION: 100 % | WEIGHT: 188.9 LBS

## 2025-05-12 DIAGNOSIS — Z01.818 PREOP GENERAL PHYSICAL EXAM: Primary | ICD-10-CM

## 2025-05-12 DIAGNOSIS — K80.20 CALCULUS OF GALLBLADDER WITHOUT CHOLECYSTITIS WITHOUT OBSTRUCTION: ICD-10-CM

## 2025-05-12 PROCEDURE — 99214 OFFICE O/P EST MOD 30 MIN: CPT | Performed by: FAMILY MEDICINE

## 2025-05-12 PROCEDURE — 3074F SYST BP LT 130 MM HG: CPT | Performed by: FAMILY MEDICINE

## 2025-05-12 PROCEDURE — 1126F AMNT PAIN NOTED NONE PRSNT: CPT | Performed by: FAMILY MEDICINE

## 2025-05-12 PROCEDURE — G2211 COMPLEX E/M VISIT ADD ON: HCPCS | Performed by: FAMILY MEDICINE

## 2025-05-12 PROCEDURE — 3078F DIAST BP <80 MM HG: CPT | Performed by: FAMILY MEDICINE

## 2025-05-12 ASSESSMENT — PAIN SCALES - GENERAL: PAINLEVEL_OUTOF10: NO PAIN (0)

## 2025-05-12 NOTE — PROGRESS NOTES
Preoperative Evaluation  [facility]  [address] [address] [facility] [address] [facility] [address]  Phone: [phone]  Primary Provider: Physician No Ref-Primary  Pre-op Performing Provider: Lev Mortensen DO  May 12, 2025             5/12/2025   Surgical Information   What procedure is being done? galbladder removal   Facility or Hospital where procedure/surgery will be performed: [facility]   Who is doing the procedure / surgery? Dr Lito Hermosillo   Date of surgery / procedure: 5/30/25   Time of surgery / procedure: N/A   Where do you plan to recover after surgery? at home with family       Assessment & Plan     The proposed surgical procedure is considered INTERMEDIATE risk.    A total of 30 minutes was spent discussing with patient past medical history and management, on current concerns, on physical exam, on ongoing assessment and management, and on documentation.    The longitudinal plan of care for the diagnosis(es)/condition(s) as documented were addressed during this visit. Due to the added complexity in care, I will continue to support Nick in the subsequent management and with ongoing continuity of care.    Preop general physical exam  Patient cleared for upcoming procedure.    Calculus of gallbladder without cholecystitis without obstruction              - No identified additional risk factors other than previously addressed    Preoperative Medication Instructions  Antiplatelet or Anticoagulation Medication Instructions   - We reviewed the medication list and the patient is not on an antiplatelet or anticoagulation medications.    Additional Medication Instructions  We reviewed the medication list and there are no chronic medications that need to be adjusted for this procedure.    Recommendation  Approval given to proceed with proposed procedure, without further diagnostic evaluation.        Arlette Rmoero is a 34 year old, presenting for the following:  Pre-Op Exam          5/12/2025      8:07 AM   Additional Questions   Roomed by Leyla Mosquera MA     HPI: Patient has history of gallstones, causing intermittent right upper abdominal comfort.  Condition will be treated through upcoming surgical procedure.          5/12/2025   Pre-Op Questionnaire   Have you ever had a heart attack or stroke? No   Have you ever had surgery on your heart or blood vessels, such as a stent placement, a coronary artery bypass, or surgery on an artery in your head, neck, heart, or legs? No   Do you have chest pain with activity? No   Do you have a history of heart failure? No   Do you currently have a cold, bronchitis or symptoms of other infection? No   Do you have a cough, shortness of breath, or wheezing? No   Do you or anyone in your family have previous history of blood clots? No   Do you or does anyone in your family have a serious bleeding problem such as prolonged bleeding following surgeries or cuts? No   Have you ever had problems with anemia or been told to take iron pills? No   Have you had any abnormal blood loss such as black, tarry or bloody stools? No   Have you ever had a blood transfusion? No   Are you willing to have a blood transfusion if it is medically needed before, during, or after your surgery? Yes   Have you or any of your relatives ever had problems with anesthesia? No   Do you have sleep apnea, excessive snoring or daytime drowsiness? No   Do you have any artifical heart valves or other implanted medical devices like a pacemaker, defibrillator, or continuous glucose monitor? No   Do you have artificial joints? No   Are you allergic to latex? No     Advance Care Planning    Discussed advance care planning with patient; informed AVS has link to [facility].  Form also printed for patient.   Preoperative Review of    reviewed - no record of controlled substances prescribed.          There are no active problems to display for this patient.     History reviewed. No pertinent past medical  "history.  Past Surgical History:   Procedure Laterality Date    KNEE SURGERY Left     karlie marinelli     No current outpatient medications on file.       Allergies   Allergen Reactions    Iodine Other (See Comments)    Penicillins Unknown        Social History     Tobacco Use    Smoking status: Former     Current packs/day: 0.00     Types: Cigarettes     Quit date:      Years since quittin.3    Smokeless tobacco: Never    Tobacco comments:     No Vaping   Substance Use Topics    Alcohol use: Not Currently       History   Drug Use Unknown             Review of Systems  Constitutional, neuro, ENT, endocrine, pulmonary, cardiac, gastrointestinal, genitourinary, musculoskeletal, integument and psychiatric systems are negative, except as otherwise noted.    Has periodic RUQ abdominal pain, controlled with careful diet.     Has loose skin and subcutaneous tissue over abdomen after losing 135 lbs. we discussed possible surgery to address this through consultation with general surgeon or plastic surgery if he desires.    Objective    BP 96/62 (BP Location: Right arm, Patient Position: Sitting, Cuff Size: Adult Large)   Pulse 54   Resp 16   Ht 1.702 m (5' 7\")   Wt 85.7 kg (188 lb 14.4 oz)   SpO2 100%   BMI 29.59 kg/m     Estimated body mass index is 29.59 kg/m  as calculated from the following:    Height as of this encounter: 1.702 m (5' 7\").    Weight as of this encounter: 85.7 kg (188 lb 14.4 oz).  Physical Exam    General: Vital signs reviewed.  Patient is in no acute appearing distress.  Breathing appears nonlabored.  Patient is alert and oriented ×3.      ENT: Ear exam shows bilateral tympanic membranes to be clear without injection, nasal turbinates show no injection or edema, no pharyngeal injection or exudate.    Neck: supple with no adenoapthy, palpable abnormal masses, or thyroid abnormality.    Eyes: No scleral, lid, or periorbital injection or edema noted.  No eye mattering noted.  Corneas " are clear. Pupils are equal round and reactive to light with normal consensual eye movement.    Heart: Heart rate is regular without murmur.    Lungs: Lungs are clear to auscultation with good airflow bilaterally.    Abdomen:  Abdomen is soft, nontender.  No palpable abnormal masses or organomegaly.  Bowel sounds are normal.    Genital exam: Patient declined exam for possible hernia.    Back: No areas of tenderness.    Skin: Warm and dry, with no rash or abnormal lesions noted.    Extremities: No lower leg edema noted.  No joint edema or restricted range of motion noted.    Neuro: No acute focal deficits or other abnormalities noted.    Psych: Patient is very pleasant, making good eye contact, with clear and fluent speech.  Answers questions appropriately. No psychomotor agitation.       Recent Labs   Lab Test 03/17/25  0913   HGB 14.5         POTASSIUM 4.7   CR 0.99        Diagnostics  No labs were ordered during this visit.   No EKG required, no history of coronary heart disease, significant arrhythmia, peripheral arterial disease or other structural heart disease.    Revised Cardiac Risk Index (RCRI)  The patient has the following serious cardiovascular risks for perioperative complications:   - No serious cardiac risks = 0 points     RCRI Interpretation: 0 points: Class I (very low risk - 0.4% complication rate)         Signed Electronically by: Lev Mortensen DO  A copy of this evaluation report is provided to the requesting physician.

## 2025-05-12 NOTE — PATIENT INSTRUCTIONS
If you want a referral to plastic surgery to address your abdominal pannus from weight loss, let me know.     How to Take Your Medication Before Surgery  Preoperative Medication Instructions   Antiplatelet or Anticoagulation Medication Instructions   - We reviewed the medication list and the patient is not on an antiplatelet or anticoagulation medications.    Additional Medication Instructions  We reviewed the medication list and there are no chronic medications that need to be adjusted for this procedure.       Patient Education   Preparing for Your Surgery  For Adults  Getting started  In most cases, a nurse will call to review your health history and instructions. They will give you an arrival time based on your scheduled surgery time. Please be ready to share:  Your doctor's clinic name and phone number  Your medical, surgical, and anesthesia history  A list of allergies and sensitivities  A list of medicines, including herbal treatments and over-the-counter drugs  Whether the patient has a legal guardian (ask how to send us the papers in advance)  Note: You may not receive a call if you were seen at our PAC (Preoperative Assessment Center).  Please tell us if you're pregnant--or if there's any chance you might be pregnant. Some surgeries may injure a fetus (unborn baby), so they require a pregnancy test. Surgeries that are safe for a fetus don't always need a test, and you can choose whether to have one.   Preparing for surgery  Within 10 to 30 days of surgery: Have a pre-op exam (sometimes called an H&P, or History and Physical). This can be done at a clinic or pre-operative center.  If you're having a , you may not need this exam. Talk to your care team.  At your pre-op exam, talk to your care team about all medicines you take. (This includes CBD oil and any drugs, such as THC, marijuana, and other forms of cannabis.) If you need to stop any medicine before surgery, ask when to start taking it  again.  This is for your safety. Many medicines and drugs can make you bleed too much during surgery. Some change how well surgery (anesthesia) drugs work.  Call your insurance company to let them know you're having surgery. (If you don't have insurance, call [phone].)  Call your clinic if there's any change in your health. This includes a scrape or scratch near the surgery site, or any signs of a cold (sore throat, runny nose, cough, rash, fever).  Eating and drinking guidelines  For your safety: Unless your surgeon tells you otherwise, follow the guidelines below.  Eat and drink as normal until 8 hours before you arrive for surgery. After that, no food or milk. You can spit out gum when you arrive.  Drink clear liquids until 2 hours before you arrive. These are liquids you can see through, like water, Gatorade, and Propel Water. They also include plain black coffee and tea (no cream or milk).  No alcohol for 24 hours before you arrive. The night before surgery, stop any drinks that contain THC.  If your care team tells you to take medicine on the morning of surgery, it's okay to take it with a sip of water. No other medicines or drugs are allowed (including CBD oil)--follow your care team's instructions.  If you have questions the day of surgery, call your hospital or surgery center.   Preventing infection  Shower or bathe the night before and the morning of surgery. Follow the instructions your clinic gave you. (If no instructions, use regular soap.)  Don't shave or clip hair near your surgery site. We'll remove the hair if needed.  Don't smoke or vape the morning of surgery. No chewing tobacco for 6 hours before you arrive. A nicotine patch is okay. You may spit out nicotine gum when you arrive.  For some surgeries, the surgeon will tell you to fully quit smoking and nicotine.  We will make every effort to keep you safe from infection. We will:  Clean our hands often with soap and water (or an alcohol-based  hand rub).  Clean the skin at your surgery site with a special soap that kills germs.  Give you a special gown to keep you warm. (Cold raises the risk of infection.)  Wear hair covers, masks, gowns, and gloves during surgery.  Give antibiotic medicine, if prescribed. Not all surgeries need this medicine.  What to bring on the day of surgery  Photo ID and insurance card  Copy of your health care directive, if you have one  Glasses and hearing aids (bring cases)  You can't wear contacts during surgery  Inhaler and eye drops, if you use them (tell us about these when you arrive)  CPAP machine or breathing device, if you use them  A few personal items, if spending the night  If you have . . .  A pacemaker, ICD (cardiac defibrillator), or other implant: Bring the ID card.  An implanted stimulator: Bring the remote control.  A legal guardian: Bring a copy of the certified (court-stamped) guardianship papers.  Please remove any jewelry, including body piercings. Leave jewelry and other valuables at home.  If you're going home the day of surgery  You must have a responsible adult drive you home. They should stay with you overnight as well.  If you don't have someone to stay with you, and you aren't safe to go home alone, we may keep you overnight. Insurance often won't pay for this.  After surgery  If it's hard to control your pain or you need more pain medicine, please call your surgeon's office.  Questions?   If you have any questions for your care team, list them here:   ____________________________________________________________________________________________________________________________________________________________________________________________________________________________________________________________  For informational purposes only. Not to replace the advice of your health care provider. Copyright   2003, 2019 [facility]. All rights reserved. Clinically reviewed by Ham Win MD.  [employer] [id number] - REV 08/24.

## 2025-05-12 NOTE — H&P (VIEW-ONLY)
Preoperative Evaluation  [facility]  [address] [facility] [address]  Phone: [phone]  Primary Provider: Physician No Ref-Primary  Pre-op Performing Provider: Lev Mortensen DO  May 12, 2025             5/12/2025   Surgical Information   What procedure is being done? galbladder removal   Facility or Hospital where procedure/surgery will be performed: [facility]   Who is doing the procedure / surgery? Dr Lito Hermosillo   Date of surgery / procedure: 5/30/25   Time of surgery / procedure: N/A   Where do you plan to recover after surgery? at home with family       Assessment & Plan     The proposed surgical procedure is considered INTERMEDIATE risk.    A total of 30 minutes was spent discussing with patient past medical history and management, on current concerns, on physical exam, on ongoing assessment and management, and on documentation.    The longitudinal plan of care for the diagnosis(es)/condition(s) as documented were addressed during this visit. Due to the added complexity in care, I will continue to support Nick in the subsequent management and with ongoing continuity of care.    Preop general physical exam  Patient cleared for upcoming procedure.    Calculus of gallbladder without cholecystitis without obstruction              - No identified additional risk factors other than previously addressed    Preoperative Medication Instructions  Antiplatelet or Anticoagulation Medication Instructions   - We reviewed the medication list and the patient is not on an antiplatelet or anticoagulation medications.    Additional Medication Instructions  We reviewed the medication list and there are no chronic medications that need to be adjusted for this procedure.    Recommendation  Approval given to proceed with proposed procedure, without further diagnostic evaluation.        Arlette Romero is a 34 year old, presenting for the following:  Pre-Op Exam          5/12/2025      8:07 AM   Additional Questions   Roomed by Leyla Mosquera MA     HPI: Patient has history of gallstones, causing intermittent right upper abdominal comfort.  Condition will be treated through upcoming surgical procedure.          5/12/2025   Pre-Op Questionnaire   Have you ever had a heart attack or stroke? No   Have you ever had surgery on your heart or blood vessels, such as a stent placement, a coronary artery bypass, or surgery on an artery in your head, neck, heart, or legs? No   Do you have chest pain with activity? No   Do you have a history of heart failure? No   Do you currently have a cold, bronchitis or symptoms of other infection? No   Do you have a cough, shortness of breath, or wheezing? No   Do you or anyone in your family have previous history of blood clots? No   Do you or does anyone in your family have a serious bleeding problem such as prolonged bleeding following surgeries or cuts? No   Have you ever had problems with anemia or been told to take iron pills? No   Have you had any abnormal blood loss such as black, tarry or bloody stools? No   Have you ever had a blood transfusion? No   Are you willing to have a blood transfusion if it is medically needed before, during, or after your surgery? Yes   Have you or any of your relatives ever had problems with anesthesia? No   Do you have sleep apnea, excessive snoring or daytime drowsiness? No   Do you have any artifical heart valves or other implanted medical devices like a pacemaker, defibrillator, or continuous glucose monitor? No   Do you have artificial joints? No   Are you allergic to latex? No     Advance Care Planning    Discussed advance care planning with patient; informed AVS has link to [employer].  Form also printed for patient.   Preoperative Review of    reviewed - no record of controlled substances prescribed.          There are no active problems to display for this patient.     History reviewed. No pertinent past medical  "history.  Past Surgical History:   Procedure Laterality Date    KNEE SURGERY Left     karlie marinelli     No current outpatient medications on file.       Allergies   Allergen Reactions    Iodine Other (See Comments)    Penicillins Unknown        Social History     Tobacco Use    Smoking status: Former     Current packs/day: 0.00     Types: Cigarettes     Quit date:      Years since quittin.3    Smokeless tobacco: Never    Tobacco comments:     No Vaping   Substance Use Topics    Alcohol use: Not Currently       History   Drug Use Unknown             Review of Systems  Constitutional, neuro, ENT, endocrine, pulmonary, cardiac, gastrointestinal, genitourinary, musculoskeletal, integument and psychiatric systems are negative, except as otherwise noted.    Has periodic RUQ abdominal pain, controlled with careful diet.     Has loose skin and subcutaneous tissue over abdomen after losing 135 lbs. we discussed possible surgery to address this through consultation with general surgeon or plastic surgery if he desires.    Objective    BP 96/62 (BP Location: Right arm, Patient Position: Sitting, Cuff Size: Adult Large)   Pulse 54   Resp 16   Ht 1.702 m (5' 7\")   Wt 85.7 kg (188 lb 14.4 oz)   SpO2 100%   BMI 29.59 kg/m     Estimated body mass index is 29.59 kg/m  as calculated from the following:    Height as of this encounter: 1.702 m (5' 7\").    Weight as of this encounter: 85.7 kg (188 lb 14.4 oz).  Physical Exam    General: Vital signs reviewed.  Patient is in no acute appearing distress.  Breathing appears nonlabored.  Patient is alert and oriented ×3.      ENT: Ear exam shows bilateral tympanic membranes to be clear without injection, nasal turbinates show no injection or edema, no pharyngeal injection or exudate.    Neck: supple with no adenoapthy, palpable abnormal masses, or thyroid abnormality.    Eyes: No scleral, lid, or periorbital injection or edema noted.  No eye mattering noted.  Corneas " are clear. Pupils are equal round and reactive to light with normal consensual eye movement.    Heart: Heart rate is regular without murmur.    Lungs: Lungs are clear to auscultation with good airflow bilaterally.    Abdomen:  Abdomen is soft, nontender.  No palpable abnormal masses or organomegaly.  Bowel sounds are normal.    Genital exam: Patient declined exam for possible hernia.    Back: No areas of tenderness.    Skin: Warm and dry, with no rash or abnormal lesions noted.    Extremities: No lower leg edema noted.  No joint edema or restricted range of motion noted.    Neuro: No acute focal deficits or other abnormalities noted.    Psych: Patient is very pleasant, making good eye contact, with clear and fluent speech.  Answers questions appropriately. No psychomotor agitation.       Recent Labs   Lab Test 03/17/25  0913   HGB 14.5         POTASSIUM 4.7   CR 0.99        Diagnostics  No labs were ordered during this visit.   No EKG required, no history of coronary heart disease, significant arrhythmia, peripheral arterial disease or other structural heart disease.    Revised Cardiac Risk Index (RCRI)  The patient has the following serious cardiovascular risks for perioperative complications:   - No serious cardiac risks = 0 points     RCRI Interpretation: 0 points: Class I (very low risk - 0.4% complication rate)         Signed Electronically by: Lev Mortensen DO  A copy of this evaluation report is provided to the requesting physician.

## 2025-05-30 ENCOUNTER — HOSPITAL ENCOUNTER (OUTPATIENT)
Facility: CLINIC | Age: 35
Discharge: HOME | End: 2025-05-30
Attending: SURGERY | Admitting: SURGERY
Payer: COMMERCIAL

## 2025-05-30 VITALS
BODY MASS INDEX: 29.98 KG/M2 | TEMPERATURE: 98.7 F | WEIGHT: 191.4 LBS | RESPIRATION RATE: 12 BRPM | HEART RATE: 53 BPM | DIASTOLIC BLOOD PRESSURE: 75 MMHG | SYSTOLIC BLOOD PRESSURE: 121 MMHG | OXYGEN SATURATION: 98 %

## 2025-05-30 DIAGNOSIS — K80.20 CALCULUS OF GALLBLADDER WITHOUT CHOLECYSTITIS WITHOUT OBSTRUCTION: ICD-10-CM

## 2025-05-30 PROCEDURE — 250N000025 HC SEVOFLURANE, PER MIN: Performed by: SURGERY

## 2025-05-30 PROCEDURE — 250N000009 HC RX 250: Performed by: SURGERY

## 2025-05-30 PROCEDURE — 88304 TISSUE EXAM BY PATHOLOGIST: CPT | Mod: TC | Performed by: SURGERY

## 2025-05-30 PROCEDURE — 999N000141 HC STATISTIC PRE-PROCEDURE NURSING ASSESSMENT: Performed by: SURGERY

## 2025-05-30 PROCEDURE — 47562 LAPAROSCOPIC CHOLECYSTECTOMY: CPT | Mod: AS | Performed by: PHYSICIAN ASSISTANT

## 2025-05-30 PROCEDURE — 710N000012 HC RECOVERY PHASE 2, PER MINUTE: Performed by: SURGERY

## 2025-05-30 PROCEDURE — 258N000003 HC RX IP 258 OP 636: Performed by: ANESTHESIOLOGY

## 2025-05-30 PROCEDURE — 47562 LAPAROSCOPIC CHOLECYSTECTOMY: CPT | Performed by: SURGERY

## 2025-05-30 PROCEDURE — 272N000001 HC OR GENERAL SUPPLY STERILE: Performed by: SURGERY

## 2025-05-30 PROCEDURE — 250N000011 HC RX IP 250 OP 636: Performed by: SURGERY

## 2025-05-30 PROCEDURE — 250N000013 HC RX MED GY IP 250 OP 250 PS 637: Performed by: SURGERY

## 2025-05-30 PROCEDURE — 710N000009 HC RECOVERY PHASE 1, LEVEL 1, PER MIN: Performed by: SURGERY

## 2025-05-30 PROCEDURE — 370N000017 HC ANESTHESIA TECHNICAL FEE, PER MIN: Performed by: SURGERY

## 2025-05-30 PROCEDURE — S2900 ROBOTIC SURGICAL SYSTEM: HCPCS | Performed by: SURGERY

## 2025-05-30 PROCEDURE — 250N000011 HC RX IP 250 OP 636: Performed by: ANESTHESIOLOGY

## 2025-05-30 PROCEDURE — 360N000080 HC SURGERY LEVEL 7, PER MIN: Performed by: SURGERY

## 2025-05-30 RX ORDER — ONDANSETRON 2 MG/ML
4 INJECTION INTRAMUSCULAR; INTRAVENOUS EVERY 30 MIN PRN
Status: DISCONTINUED | OUTPATIENT
Start: 2025-05-30 | End: 2025-05-30 | Stop reason: HOSPADM

## 2025-05-30 RX ORDER — OXYCODONE HYDROCHLORIDE 5 MG/1
10 TABLET ORAL
Status: DISCONTINUED | OUTPATIENT
Start: 2025-05-30 | End: 2025-05-30 | Stop reason: HOSPADM

## 2025-05-30 RX ORDER — SODIUM CHLORIDE, SODIUM LACTATE, POTASSIUM CHLORIDE, CALCIUM CHLORIDE 600; 310; 30; 20 MG/100ML; MG/100ML; MG/100ML; MG/100ML
INJECTION, SOLUTION INTRAVENOUS CONTINUOUS
Status: DISCONTINUED | OUTPATIENT
Start: 2025-05-30 | End: 2025-05-30 | Stop reason: HOSPADM

## 2025-05-30 RX ORDER — HYDROCODONE BITARTRATE AND ACETAMINOPHEN 5; 325 MG/1; MG/1
1-2 TABLET ORAL EVERY 4 HOURS PRN
Qty: 10 TABLET | Refills: 0 | Status: SHIPPED | OUTPATIENT
Start: 2025-05-30

## 2025-05-30 RX ORDER — NALOXONE HYDROCHLORIDE 0.4 MG/ML
0.1 INJECTION, SOLUTION INTRAMUSCULAR; INTRAVENOUS; SUBCUTANEOUS
Status: DISCONTINUED | OUTPATIENT
Start: 2025-05-30 | End: 2025-05-30 | Stop reason: HOSPADM

## 2025-05-30 RX ORDER — ONDANSETRON 4 MG/1
4 TABLET, ORALLY DISINTEGRATING ORAL EVERY 30 MIN PRN
Status: DISCONTINUED | OUTPATIENT
Start: 2025-05-30 | End: 2025-05-30 | Stop reason: HOSPADM

## 2025-05-30 RX ORDER — CEFAZOLIN SODIUM/WATER 2 G/20 ML
2 SYRINGE (ML) INTRAVENOUS SEE ADMIN INSTRUCTIONS
Status: DISCONTINUED | OUTPATIENT
Start: 2025-05-30 | End: 2025-05-30 | Stop reason: HOSPADM

## 2025-05-30 RX ORDER — FENTANYL CITRATE 50 UG/ML
25 INJECTION, SOLUTION INTRAMUSCULAR; INTRAVENOUS EVERY 5 MIN PRN
Status: DISCONTINUED | OUTPATIENT
Start: 2025-05-30 | End: 2025-05-30 | Stop reason: HOSPADM

## 2025-05-30 RX ORDER — CEFAZOLIN SODIUM/WATER 2 G/20 ML
2 SYRINGE (ML) INTRAVENOUS
Status: COMPLETED | OUTPATIENT
Start: 2025-05-30 | End: 2025-05-30

## 2025-05-30 RX ORDER — HYDROMORPHONE HCL IN WATER/PF 6 MG/30 ML
0.2 PATIENT CONTROLLED ANALGESIA SYRINGE INTRAVENOUS EVERY 5 MIN PRN
Status: DISCONTINUED | OUTPATIENT
Start: 2025-05-30 | End: 2025-05-30 | Stop reason: HOSPADM

## 2025-05-30 RX ORDER — FENTANYL CITRATE 50 UG/ML
50 INJECTION, SOLUTION INTRAMUSCULAR; INTRAVENOUS EVERY 5 MIN PRN
Status: DISCONTINUED | OUTPATIENT
Start: 2025-05-30 | End: 2025-05-30 | Stop reason: HOSPADM

## 2025-05-30 RX ORDER — DEXAMETHASONE SODIUM PHOSPHATE 4 MG/ML
4 INJECTION, SOLUTION INTRA-ARTICULAR; INTRALESIONAL; INTRAMUSCULAR; INTRAVENOUS; SOFT TISSUE
Status: DISCONTINUED | OUTPATIENT
Start: 2025-05-30 | End: 2025-05-30 | Stop reason: HOSPADM

## 2025-05-30 RX ORDER — BUPIVACAINE HYDROCHLORIDE 5 MG/ML
INJECTION, SOLUTION PERINEURAL PRN
Status: DISCONTINUED | OUTPATIENT
Start: 2025-05-30 | End: 2025-05-30 | Stop reason: HOSPADM

## 2025-05-30 RX ORDER — LIDOCAINE 40 MG/G
CREAM TOPICAL
Status: DISCONTINUED | OUTPATIENT
Start: 2025-05-30 | End: 2025-05-30 | Stop reason: HOSPADM

## 2025-05-30 RX ORDER — HYDROCODONE BITARTRATE AND ACETAMINOPHEN 5; 325 MG/1; MG/1
1 TABLET ORAL
Status: COMPLETED | OUTPATIENT
Start: 2025-05-30 | End: 2025-05-30

## 2025-05-30 RX ORDER — HYDROMORPHONE HCL IN WATER/PF 6 MG/30 ML
0.4 PATIENT CONTROLLED ANALGESIA SYRINGE INTRAVENOUS EVERY 5 MIN PRN
Status: DISCONTINUED | OUTPATIENT
Start: 2025-05-30 | End: 2025-05-30 | Stop reason: HOSPADM

## 2025-05-30 RX ORDER — OXYCODONE HYDROCHLORIDE 5 MG/1
5 TABLET ORAL
Status: DISCONTINUED | OUTPATIENT
Start: 2025-05-30 | End: 2025-05-30 | Stop reason: HOSPADM

## 2025-05-30 RX ORDER — INDOCYANINE GREEN AND WATER 25 MG
2.5 KIT INJECTION ONCE
Status: COMPLETED | OUTPATIENT
Start: 2025-05-30 | End: 2025-05-30

## 2025-05-30 RX ADMIN — HYDROCODONE BITARTRATE AND ACETAMINOPHEN 1 TABLET: 5; 325 TABLET ORAL at 10:38

## 2025-05-30 RX ADMIN — INDOCYANINE GREEN AND WATER 2.5 MG: KIT at 06:52

## 2025-05-30 RX ADMIN — FENTANYL CITRATE 25 MCG: 50 INJECTION, SOLUTION INTRAMUSCULAR; INTRAVENOUS at 09:30

## 2025-05-30 RX ADMIN — SODIUM CHLORIDE, SODIUM LACTATE, POTASSIUM CHLORIDE, AND CALCIUM CHLORIDE: .6; .31; .03; .02 INJECTION, SOLUTION INTRAVENOUS at 06:52

## 2025-05-30 RX ADMIN — FENTANYL CITRATE 25 MCG: 50 INJECTION, SOLUTION INTRAMUSCULAR; INTRAVENOUS at 09:36

## 2025-05-30 RX ADMIN — ONDANSETRON 4 MG: 2 INJECTION, SOLUTION INTRAMUSCULAR; INTRAVENOUS at 10:38

## 2025-05-30 ASSESSMENT — ACTIVITIES OF DAILY LIVING (ADL)
ADLS_ACUITY_SCORE: 15

## 2025-05-30 NOTE — OP NOTE
[facility] General Surgery Operative Note    Pre-operative diagnosis: symptomatic gallstones   Post-operative diagnosis: same   Procedure: Robot assisted laparoscopic cholecystectomy   Surgeon: Lito Hermosillo MD   Assistant(s): Brandi Pappas PA-C  The Physician Assistant was medically necessary for their expertise in prepping, robotic instrument exchange, passing of material through port sites, closure of port sites, suturing and retraction.   Anesthesia: general   Estimated blood loss:  Specimen: 10 cc  gallbladder and contents               INDICATION FOR OPERATION: This is a 34 year old male who presented to my office with abdominal pain. Studies including ultrasound were consistent with biliary colic. We discussed robotic assisted laparoscopic cholecystectomy and the patient agreed to proceed after hearing the risks and benefits.    DESCRIPTION OF PROCEDURE:  The patient was taken to the operating room and placed on the table in supine position.  General endotracheal anesthesia was induced and the abdomen was prepped and draped in standard sterile fashion.     A small supra-umbilical incision was made with a skin knife. We dissected down to the fascia, the umbilical stalk was the grasped and lifted anteriorly. A 15 blade knife was used to incise the fascia and peritoneum. An 0 Vicryl figure of eight suture was placed across the fascia. An 8 mm robotic port was placed and a pneumoperitoneum was established. The abdomen was then surveyed and no injuries were seen from our entry. Three additional 8mm robotic ports were placed across the mid-abdomen. The patient was placed in reverse Trendelenburg and right side up.  The robot was then docked.     The fundus of the gallbladder was grasped and retracted cephalad with a prograsp. The gallbladder appeared grossly normal but was mildly thick-walled.  The infundibulum was grasped and retracted laterally.  The peritoneum over the medial and lateral  aspects of the triangle of Calot was taken down with blunt dissection and cautery.  The cystic duct and artery were freed up from surrounding tissues.  The triangle of Calot was skeletonized revealing the critical view of safety.  Intraoperative fluorescence was used to evaluate the biliary anatomy with no additional biliary structures lighting up other than the cystic duct and common bile duct.    The duct was clipped twice proximally, once distally, and the cystic artery was clipped once proximally, then both were transected, using cautery on the distal side of the cystic duct.  The gallbladder was then removed from the liver using the cautery.  Before the gallbladder was completely removed from the cystic plate fluorescence was again used to evaluate for any additional biliary structures and none seen. The gallbladder was passed into an Endocatch bag in the umbilical port site. We observed the right upper quadrant carefully for hemostasis.  Hemostasis was assured.      The endocatch bag was removed from the abdomen through the umbilical port. The umbilical port fascia was then closed with the 0-Vicryl figure-of-eight stitch.    All of the incisions were closed with interrupted 4-0 Vicryl subcuticular sutures and Dermabond.  The patient tolerated the procedure well.  Sponge and instrument counts were correct.      FINDINGS: Gallstones, mild gallbladder wall thickening.    Lito Hermosillo MD

## 2025-05-30 NOTE — DISCHARGE INSTRUCTIONS
HOME CARE FOLLOWING LAPAROSCOPIC CHOLECYSTECTOMY  JEREMIAS Thao, RAÚL Reese C. Pratt, J. Shaheen    INCISIONAL CARE:  Replace the bandage over your incisions DAILY until all drainage stops, or if more comfortable to have in place.  If present, leave the steri-strips (white paper tapes) in place for 14 days after surgery.  If Dermabond (a type of skin glue) is present, leave in place until it wears/flakes off (2-3 weeks).     BATHING:  OK to shower 48 hours after surgery.  Avoid baths for 1 week after surgery.  You may wash your hair at any time.  Gently pat your incision dry after bathing.  Do not apply lotions, creams, or ointments to incisions.    ACTIVITY:  Light Activity -- you may immediately be up and about as tolerated.  Walking is encouraged, increase as tolerated.  Driving/Light Work-- when comfortable and off narcotic pain medications.  Strenuous Work/Activity -- limit lifting to 20 pounds for 2 weeks.  Progressively increase with time.  Active Sports (running, biking, etc.) -- cautiously resume after 2 weeks.    DISCOMFORT:  Local anesthetic placed at surgery should provide relief for 4-8 hours.  Begin taking pain pills before discomfort is severe.  Take the pain medication with some food, when possible, to minimize side effects.  Intermittent use of ice packs may help during the first 1-3 weeks after surgery.  Expect gradual improvement.    Over-the-counter anti-inflammatory medications (i.e. Ibuprofen/Advil/Motrin or Naprosyn/Aleve) may be used per package instructions in addition to or while tapering off the narcotic pain medications to decrease swelling and sensitivity.  DO NOT TAKE these Anti-inflammatory medications if your primary physician has advised against doing so, or if you have acid reflux, ulcer, or bleeding disorder, or take blood-thinner medications.  Call your primary physician or the surgery office if you have medication questions.    After laparoscopic  cholecystectomy, you may have shoulder or upper back discomfort due to the gas used during surgery.  This is temporary and should resolve within 2-3 days.  Frequent short walks may help with this.  You may have decreased energy level for 1-2 weeks after surgery related to your recovery.    DIET:  Start with liquids and gradually increase diet as tolerated.  Drink plenty of fluids.  While taking pain medications, consider use of a stool softener, increase your fiber in your diet, or add a fiber supplement (like Metamucil, Citrucel) to help prevent constipation - a possible side effect of pain medications.  It is not uncommon to experience some bowel changes (loose stools or constipation) after surgery.  Your body has to adapt to you no longer having a gall bladder.  To help minimize this side effect, avoid fatty foods for 1-2 weeks after surgery.  You may then slowly increase the amount of fatty foods in your diet.      NAUSEA:  If nauseated from the anesthetic/pain meds; rest in bed, get up cautiously with assistance, and drink clear liquids (juice, tea, broth).    FOLLOW-UP AFTER SURGERY:  -Our office will contact you approximately 2-3 weeks after surgery to check on your progress and answer any questions you may have.  If you are doing well, you will not need to return for an office appointment.  If any concerns are identified over the phone, we will help you make an appointment to see a provider.    -If you have not received a phone call, have any questions or concerns, or would like to be seen, please call us at [phone].  We are located at: [address], [address]; [place] [address]    -CONTACT US IF THE FOLLOWING DEVELOPS:   1. A fever that is above 101     2. Increased redness, warmth, drainage, bleeding, or swelling.   3. Pain that is not relieved by rest/ice and your prescription.   4.  Increasing pain after 48 hours.   5. Drainage that is thick, cloudy, yellow, green or white.   6. Any other  questions or concerns.      FREQUENTLY ASKED QUESTIONS:    Q:  How should my incision look?    A:  Normally your incision will appear slightly swollen with light redness directly along the incision itself as it heals.  It may feel like a bump or ridge as the healing/scarring happens, and over time (3-4 months) this bump or ridge feeling should slowly go away.  In general, clear or pink watery drainage can be normal at first as your incision heals, but should decrease over time.    Q:  How do I know if my incision is infected?  A:  Look at your incision for signs of infection, like redness around the incision spreading to surrounding skin, or drainage of cloudy or foul-smelling drainage.  If you feel warm, check your temperature to see if you are running a fever.    **If any of these things occur, please notify the nurse at our office.  We may need you to come into the office for an incision check.      Q:  How do I take care of my incision?  A:  If you have a dressing in place - Starting the day after surgery, replace the dressing 1-2 times a day until there is no further drainage from the incision.  At that time, a dressing is no longer needed.  Try to minimize tape on the skin if irritation is occurring at the tape sites.  If you have significant irritation from tape on the skin, please call the office to discuss other method of dressing your incision.    Small pieces of tape called  steri-strips  may be present directly overlying your incision; these may be removed 10 days after surgery unless otherwise specified by your surgeon.  If these tapes start to loosen at the ends, you may trim them back until they fall off or are removed.    A:  If you had  Dermabond  tissue glue used as a dressing (this causes your incision to look shiny with a clear covering over it) - This type of dressing wears off with time and does not require more dressings over the top unless it is draining around the glue as it wears off.  Do  not apply ointments or lotions over the incisions until the glue has completely worn off.    Q:  There is a piece of tape or a sticky  lead  still on my skin.  Can I remove this?  A:  Sometimes the sticky  leads  used for monitoring during surgery or for evaluation in the emergency department are not all removed while you are in the hospital.  These sometimes have a tab or metal dot on them.  You can easily remove these on your own, like taking off a band-aid.  If there is a gel substance under the  lead , simply wipe/clean it off with a washcloth or paper towel.      Q:  What can I do to minimize constipation (very hard stools, or lack of stools)?  A:  Stay well hydrated.  Increase your dietary fiber intake or take a fiber supplement -with plenty of water.  Walk around frequently.  You may consider an over-the-counter stool-softener.  Your Pharmacist can assist you with choosing one that is stocked at your pharmacy.  Constipation is also one of the most common side effects of pain medication.  If you are using pain medication, be pro-active and try to PREVENT problems with constipation by taking the steps above BEFORE constipation becomes a problem.    Q:  What do I do if I need more pain medications?  A:  Call the office to receive refills.  Be aware that certain pain meds cannot be called into a pharmacy and actually require a paper prescription.  A change may be made in your pain med as you progress thru your recovery period or if you have side effects to certain meds.    --Pain meds are NOT refilled after 5pm on weekdays, and NOT AT ALL on the weekends, so please look ahead to prevent problems.      Q:  Why am I having a hard time sleeping now that I am at home?  A:  Many medications you receive while you are in the hospital can impact your sleep for a number of days after your surgery/hospitalization.  Decreased level of activity and naps during the day may also make sleeping at night difficult.  Try to  minimize day-time naps, and get up frequently during the day to walk around your home during your recovery time.  Sleep aides may be of some help, but are not recommended for long-term use.      Q:  I am having some back discomfort.  What should I do?  A:  This may be related to certain positioning that was required for your surgery, extended periods of time in bed, or other changes in your overall activity level.  You may try ice, heat, acetaminophen, or ibuprofen to treat this temporarily.  Note that many pain medications have acetaminophen in them and would state this on the prescription bottle.  Be sure not to exceed the maximum of 4000mg per day of acetaminophen.     **If the pain you are having does not resolve, is severe, or is a flare of back pain you have had on other occasions prior to surgery, please contact your primary physician for further recommendations or for an appointment to be examined at their office.    Q:  Why am I having headaches?  A:  Headaches can be caused by many things:  caffeine withdrawal, use of pain meds, dehydration, high blood pressure, lack of sleep, over-activity/exhaustion, flare-up of usual migraine headaches.  If you feel this is related to muscle tension (a band-like feeling around the head, or a pressure at the low-back of the head) you may try ice or heat to this area.  You may need to drink more fluids (try electrolyte drink like Gatorade), rest, or take your usual migraine medications.   **If your headaches do not resolve, worsen, are accompanied by other symptoms, or if your blood pressure is high, please call your primary physician for recommendation and/or examination.    Q:  I am unable to urinate.  What do I do?  A:  A small percentage of people can have difficulty urinating initially after surgery.  This includes being able to urinate only a very small amount at a time and feeling discomfort or pressure in the very low abdomen.  This is called  urinary retention ,  and is actually an urgent situation.  Proceed to your nearest Emergency department for evaluation (not an Urgent Care Center).  Sometimes the bladder does not work correctly after certain medications you receive during surgery, or related to certain procedures.  You may need to have a catheter placed until your bladder recovers.  When planning to go to an Emergency department, it may help to call the ER to let them know you are coming in for this problem after a surgery.  This may help you get in quicker to be evaluated.  **If you have symptoms of a urinary tract infection, please contact your primary physician for the proper evaluation and treatment.          If you have other questions, please call the office Monday thru Friday between 8am and 4:30pm to discuss with the nurse or physician assistant.  [phone]    There is a surgeon ON CALL on weekday evenings and over the weekend in case of urgent need only, and may be contacted at the same number.    If you are having an emergency, call 911 or proceed to your nearest emergency department.      Maximum acetaminophen (Tylenol) dose from all sources should not exceed 4 grams (4000 mg) per day.      You received Toradol, an IV form of Ibuprofen (Motrin) at 8:00am.  Do not take any Ibuprofen products until 2:00pm.

## 2025-05-30 NOTE — INTERVAL H&P NOTE
"I have reviewed the surgical (or preoperative) H&P that is linked to this encounter, and examined the patient. There are no significant changes    Clinical Conditions Present on Arrival:  Clinically Significant Risk Factors Present on Admission                       # Overweight: Estimated body mass index is 29.98 kg/m  as calculated from the following:    Height as of 5/12/25: 1.702 m (5' 7\").    Weight as of this encounter: 86.8 kg (191 lb 6.4 oz).       "

## 2025-06-03 LAB
PATH REPORT.COMMENTS IMP SPEC: NORMAL
PATH REPORT.COMMENTS IMP SPEC: NORMAL
PATH REPORT.FINAL DX SPEC: NORMAL
PATH REPORT.GROSS SPEC: NORMAL
PATH REPORT.MICROSCOPIC SPEC OTHER STN: NORMAL
PATH REPORT.RELEVANT HX SPEC: NORMAL
PHOTO IMAGE: NORMAL

## 2025-06-03 PROCEDURE — 88304 TISSUE EXAM BY PATHOLOGIST: CPT | Mod: 26

## 2025-06-18 ENCOUNTER — TELEPHONE (OUTPATIENT)
Dept: SURGERY | Facility: CLINIC | Age: 35
End: 2025-06-18
Payer: COMMERCIAL

## 2025-06-18 NOTE — TELEPHONE ENCOUNTER
[facility]  Post op call note     Nick Naranjo was called for an update regarding his recovery.  He underwent laparoscopic cholecystectomy by Dr. Rivera on 5/30/2025. Today he tells me he is doing well and denies any complaints. He is eating a normal diet and his bowels are regular. He states his wounds are healing well.    The pathology revealed Chronic cholecystitis cholelithiasis.  This was discussed with the patient.     He was instructed to slowly and gradually resume all normal activities. He states all of his questions were answered.  He understands our discussion.  He agrees to follow up as needed or to call our office with any concerns.    Brandi Pappas PA-C

## (undated) DEVICE — SOL WATER IRRIG 1000ML BOTTLE 07139-09

## (undated) DEVICE — DAVINCI XI DRAPE ARM 470015

## (undated) DEVICE — ENDO POUCH UNIVERSAL RETRIEVAL SYSTEM INZII 5MM CD003

## (undated) DEVICE — LINEN ORTHO ACL PACK 5447

## (undated) DEVICE — SU VICRYL+ 0 27 UR6 VLT VCP603H

## (undated) DEVICE — DAVINCI XI SEAL UNIVERSAL 5-12MM 470500

## (undated) DEVICE — CATH TRAY FOLEY SURESTEP 16FR DRAIN BAG STATOCK A899916

## (undated) DEVICE — Device

## (undated) DEVICE — GLOVE PROTEXIS BLUE W/NEU-THERA 8.0  2D73EB80

## (undated) DEVICE — CLIP ENDO HEMO-LOC GREEN MED/LG 544230

## (undated) DEVICE — SU DERMABOND ADVANCED .7ML DNX12

## (undated) DEVICE — SU VICRYL+ 4-0 UNDYED PS-2 VCP496ZH

## (undated) DEVICE — SOLUTION IV 0.9% NACL 1000ML E8000

## (undated) DEVICE — BLADE KNIFE SURG 11 371111

## (undated) DEVICE — ESU PENCIL W/HOLSTER E2350H

## (undated) DEVICE — ESU GROUND PAD ADULT W/CORD E7507

## (undated) DEVICE — DAVINCI XI GRASPER PROGRASP 8MM EXT 471093

## (undated) DEVICE — LUBRICANT INST ELECTROLUBE EL101

## (undated) DEVICE — ESU ELEC BLADE 2.75" COATED/INSULATED E1455

## (undated) DEVICE — DAVINCI XI OBTURATOR BLADELESS 8MM 470359

## (undated) RX ORDER — ONDANSETRON 2 MG/ML
INJECTION INTRAMUSCULAR; INTRAVENOUS
Status: DISPENSED
Start: 2025-05-30

## (undated) RX ORDER — KETOROLAC TROMETHAMINE 30 MG/ML
INJECTION, SOLUTION INTRAMUSCULAR; INTRAVENOUS
Status: DISPENSED
Start: 2025-05-30

## (undated) RX ORDER — GLYCOPYRROLATE 0.2 MG/ML
INJECTION, SOLUTION INTRAMUSCULAR; INTRAVENOUS
Status: DISPENSED
Start: 2025-05-30

## (undated) RX ORDER — PROPOFOL 10 MG/ML
INJECTION, EMULSION INTRAVENOUS
Status: DISPENSED
Start: 2025-05-30

## (undated) RX ORDER — CEFAZOLIN SODIUM/WATER 2 G/20 ML
SYRINGE (ML) INTRAVENOUS
Status: DISPENSED
Start: 2025-05-30

## (undated) RX ORDER — DEXAMETHASONE SODIUM PHOSPHATE 4 MG/ML
INJECTION, SOLUTION INTRA-ARTICULAR; INTRALESIONAL; INTRAMUSCULAR; INTRAVENOUS; SOFT TISSUE
Status: DISPENSED
Start: 2025-05-30

## (undated) RX ORDER — INDOCYANINE GREEN AND WATER 25 MG
KIT INJECTION
Status: DISPENSED
Start: 2025-05-30

## (undated) RX ORDER — BUPIVACAINE HYDROCHLORIDE 5 MG/ML
INJECTION, SOLUTION EPIDURAL; INTRACAUDAL; PERINEURAL
Status: DISPENSED
Start: 2025-05-30

## (undated) RX ORDER — FENTANYL CITRATE 50 UG/ML
INJECTION, SOLUTION INTRAMUSCULAR; INTRAVENOUS
Status: DISPENSED
Start: 2025-05-30

## (undated) RX ORDER — LIDOCAINE HYDROCHLORIDE 10 MG/ML
INJECTION, SOLUTION EPIDURAL; INFILTRATION; INTRACAUDAL; PERINEURAL
Status: DISPENSED
Start: 2025-05-30

## (undated) RX ORDER — HYDROCODONE BITARTRATE AND ACETAMINOPHEN 5; 325 MG/1; MG/1
TABLET ORAL
Status: DISPENSED
Start: 2025-05-30